# Patient Record
Sex: MALE | Race: WHITE | NOT HISPANIC OR LATINO | Employment: FULL TIME | URBAN - METROPOLITAN AREA
[De-identification: names, ages, dates, MRNs, and addresses within clinical notes are randomized per-mention and may not be internally consistent; named-entity substitution may affect disease eponyms.]

---

## 2017-04-19 ENCOUNTER — TELEPHONE (OUTPATIENT)
Dept: FAMILY MEDICINE | Facility: CLINIC | Age: 42
End: 2017-04-19

## 2017-04-19 DIAGNOSIS — Z98.52 STATUS POST VASECTOMY: Primary | ICD-10-CM

## 2017-04-19 NOTE — TELEPHONE ENCOUNTER
Reason for Call:  Other call back    Detailed comments: Anais, pts spouse, calling. Anais states pt had vasectomy in Dec of 2016 at [facility]. Pt received letter in mail that [facility] closed, and they were to follow up with an [facility] clinic to have semen analysis. [facility] is out of network for pts insurance, so pts spouse called a urology clinic within [place] and was instructed to contact PCP for order for semen analysis and where to have this performed. Pts spouse, Anais, is having IUD removed soon and wants to ensure this test is performed prior. Please advise.    Phone Number Patient can be reached at: Home number on file [phone] (home)    Best Time: Anytime    Can we leave a detailed message on this number? YES    Call taken on 4/19/2017 at 10:26 AM by Mavis Kathleen

## 2017-04-19 NOTE — TELEPHONE ENCOUNTER
Lab order pended.    Consent to communicate with Anais on file.    Writer unsure if this specimen has to be collected at a special lab.    Writer spoke with Eva in lab who stated post-vasectomy semen analysis can be performed here at [facility].    Routing to covering providers.    Please review and sign if agree.    Thank you!  ISELA MainN, RN

## 2017-04-19 NOTE — TELEPHONE ENCOUNTER
Please have patient call [facility] to schedule appointment and give him the following instructions.  Please also mail out referral with instructions - In Salazar HARKINS inbox (in east physician room).  Lindsey Max MD     Instruction Sheet for Patients:    [facility] ([facility])    Semen Sample Collection Procedure for Semen Analysis: To provide the most accurate semen analysis it is essential that the sample be produced at the lab. To provide an atmosphere conducive for specimen production, a TV/VCR and literature are available.     The following are some guidelines to follow for specimen collection:  1. Do not have intercourse or an ejaculation for 2 - 7 days before the day of semen analysis. Less than 2 days or greater than 7 days abstinence can influence semen quality.  2. Your wife/partner may accompany you if this will not hinder sample production. She may not facilitate sample production other than to assist in masturbation.  3. The semen sample must be collected by masturbation* into the sterile specimen container that has been provided. If you have difficulties with masturbation please consult with the technologist.  4. Specimens produced outside of the Andrology Lab will only be accepted if delivered by the man who produced the semen sample. There are no exceptions to this policy. Specimens delivered by any other individual will not be accepted. If arrangements have been made to produce the semen sample at home please follow the preceding instructions. The specimen should be kept at body temperature and delivered to Andrology Lab within 1 hr after specimen production.   5. Unlabeled specimens and specimens in non-approved containers will not be accepted.  *In cases where specimen collection is unable to be produced by masturbation, a special condom collection kit can be used for home production.  Specific instructions for proper use will be provided by trained laboratory  personnel.    If you have any questions concerning any of the above information or if you are unable to keep your appointment please notify us as soon as possible at [phone]. Thank you.

## 2017-04-19 NOTE — TELEPHONE ENCOUNTER
Called patient and reviewed lab phone number and instructions as per below.  Also informed patient this information will go out in tomorrow's mail.    Patient verbalized understanding and in agreement with plan.    JOSE Ackerman, , placed instructions/referral in outgoing mail basket.    ISELA MainN, RN

## 2017-04-24 DIAGNOSIS — Z98.52 STATUS POST VASECTOMY: ICD-10-CM

## 2017-04-24 LAB
ABSTINENCE DAYS: 3 DAYS (ref 2–7)
AGGLUTINATION: NORMAL YES/NO
ANALYSIS TEMP - CENTIGRADE: 22 CENTIGRADE
CELL FRAGMENTS: NORMAL %
COLLECTION METHOD: NORMAL
COLLECTION SITE: NORMAL
CONSENT TO RELEASE TO PARTNER: YES
IMMATURE SPERM: NORMAL %
IMMOTILE: NORMAL %
LAB RECEIPT TIME: NORMAL
LIQUEFIED: NORMAL YES/NO
NON-PROGRESSIVE MOTILITY: NORMAL %
PROGRESSIVE MOTILITY: NORMAL % (ref 32–?)
ROUND CELLS: 0.1 MILLION/ML (ref ?–2)
SPECIMEN CONCENTRATION: NORMAL MILLION/ML (ref 15–?)
SPECIMEN PH: 8 PH (ref 7.2–?)
SPECIMEN TYPE: NORMAL
SPECIMEN VOL UR: 4.5 ML (ref 1.5–?)
TIME OF ANALYSIS: NORMAL
TOTAL NUMBER: 0 MILLION (ref 39–?)
TOTAL PROGRESSIVE MOTILE: NORMAL MILLION (ref 15.6–?)
VISCOUS: NORMAL YES/NO
WBC SPECIMEN: NORMAL %

## 2017-04-24 PROCEDURE — 89321 SEMEN ANAL SPERM DETECTION: CPT

## 2017-06-22 ENCOUNTER — TELEPHONE (OUTPATIENT)
Dept: FAMILY MEDICINE | Facility: CLINIC | Age: 42
End: 2017-06-22

## 2017-06-22 DIAGNOSIS — Z83.719 FAMILY HISTORY OF POLYPS IN THE COLON: Primary | ICD-10-CM

## 2017-06-22 NOTE — TELEPHONE ENCOUNTER
Called patient and informed him of message per below from Dr. Wegener.    Patient verbalized understanding and in agreement with plan.    Patient would like Dr. Croft to do colonoscopy.    Patient was unable to take down colorectal clinic phone number, so writer called back and left voicemail with referral information, per patient request.    ISELA MainN, RN

## 2017-06-22 NOTE — TELEPHONE ENCOUNTER
Dr.Wegener--  --Patient called for screening colonoscopy order because of family history.  --I called patient and he says aunt had a foot of colon removed and  dad has polyps.      Keisha Contreras RN

## 2017-06-22 NOTE — TELEPHONE ENCOUNTER
Agree. Signed. Does he want Dr. Croft to do it however?  If so we would need a colorectal referral.  I placed that as well. Joel Wegener,MD

## 2017-07-11 ENCOUNTER — TELEPHONE (OUTPATIENT)
Dept: GASTROENTEROLOGY | Facility: OUTPATIENT CENTER | Age: 42
End: 2017-07-11

## 2017-07-11 DIAGNOSIS — Z12.11 ENCOUNTER FOR SCREENING COLONOSCOPY: Primary | ICD-10-CM

## 2017-07-11 NOTE — TELEPHONE ENCOUNTER
Patient taking any blood thinners ? no    Heart disease ? denies    Lung disease ? denies      Sleep apnea ? denies    Diabetic ? denies    Kidney disease ? denies    Dialysis ? n/a    Electronic implanted medical devices ? denies    Are you taking any narcotic pain medication ? no  What is your daily dosage ?    PTSD ? n/a    Prep instructions reviewed with patient ? patient and his wife declined review.  policy, MAC sedation plan reviewed. Advised them patient's wife should stay with him post exam    Pharmacy : [facility]    Indication for procedure :  Diagnoses      Family history of polyps in the colon [Z83.71]  - Primary             Referring provider :  Providers      Authorizing Provider Encounter Provider     Wegener, Joel Daniel Irwin, MD

## 2017-07-18 ENCOUNTER — TRANSFERRED RECORDS (OUTPATIENT)
Dept: HEALTH INFORMATION MANAGEMENT | Facility: CLINIC | Age: 42
End: 2017-07-18

## 2018-01-18 ENCOUNTER — OFFICE VISIT (OUTPATIENT)
Dept: FAMILY MEDICINE | Facility: CLINIC | Age: 43
End: 2018-01-18
Payer: COMMERCIAL

## 2018-01-18 VITALS
TEMPERATURE: 98 F | HEART RATE: 59 BPM | RESPIRATION RATE: 14 BRPM | DIASTOLIC BLOOD PRESSURE: 89 MMHG | SYSTOLIC BLOOD PRESSURE: 136 MMHG | WEIGHT: 207.75 LBS | BODY MASS INDEX: 26.66 KG/M2

## 2018-01-18 DIAGNOSIS — R68.89 FLU-LIKE SYMPTOMS: Primary | ICD-10-CM

## 2018-01-18 PROBLEM — D36.9 TUBULAR ADENOMA: Status: ACTIVE | Noted: 2017-07-18

## 2018-01-18 LAB
FLUAV+FLUBV AG SPEC QL: NEGATIVE
FLUAV+FLUBV AG SPEC QL: NEGATIVE
SPECIMEN SOURCE: NORMAL

## 2018-01-18 PROCEDURE — 87804 INFLUENZA ASSAY W/OPTIC: CPT | Performed by: FAMILY MEDICINE

## 2018-01-18 PROCEDURE — 99213 OFFICE O/P EST LOW 20 MIN: CPT | Performed by: FAMILY MEDICINE

## 2018-01-18 NOTE — PATIENT INSTRUCTIONS
rapid flu negative  Could be early in disease and false positive or form another virus  Given no risk factors no indicatio in absence of influenza positive to treat with tamiflu   flonase 1 spray each nostril daily 2 weeks  Zyrtec 10 mg daily 2 week  mucinex 600 mg twice a day 1 week  Humidifier in room may help  supportive care as below  Go to the Er if worse  Follow up with primary if symptoms persist     Your symptoms and exam today indicate that you have a viral upper respiratory illness.  This includes viral rhinosinusitis and viral bronchitis.  Antibiotics do not help viral illnesses; the best remedies treat the symptoms (see below).  The typical course of a viral illness is that you feel rather miserable for the first few days - with sore throat, runny nose/nasal congestion, cough, and sometimes fever and body aches.  You should start to feel better after about 5-7 days and much better by 10-14 days.  If you develop sudden worsening of symptoms or fever after the first 5-7 days, or if you have persistence of your symptoms beyond 14 days, let us know as you may have developed a secondary bacterial infection.      For symptom relief I suggest tryin. Steam.  Take a long, hot shower.  Or if you don't want to get in the shower just run it with the bathroom door shut for a few minutes and breathe the steam.  2. Drink hot liquids frequently such as tea or hot water with honey and lemon.  3. Acetaminophen (Tylenol) and ibuprofen (Motrin or Advil) as needed for headache, sore throat, body aches, or fever.  4. For loosening phlegm and sputum try guaifenesin (available in many combination products and alone as plain Robitussin or plain Mucinex) and for cough suppression you can try dextromethorphan (Delsym or combined in other products).  5. For nasal congestion try:    An oral decongestant.  The only decongestant I recommend is pseudoephedrine. Ask the pharmacist for the over the counter (but real) pseudoephedrine  - not phenylephrine.  This can raise your blood pressure and heart rate so do not use this if you have hypertension.       Afrin spray for 3 days.  (Never use afrin nasal spray for more than 3 days as there is a risk of developing tolerance and rebound/worsening nasal congestion if used longer than this.)    Nealmed sinus rinses.    Nasal steroid spray such as nasacort or flonase, which are over-the-counter.  6. And most importantly: plenty of rest and sleep  especially while you have a fever.     Stop smoking and avoid secondhand smoke    Drink lots of fluids such as water and clear soups. Fluids help loosen mucus. Fluids are also important because they help prevent dehydration.    Gargle with warm salt water a few times a day to relieve a sore throat. Throat sprays or lozenges may also help relieve the pain.    Avoid alcohol.    Use saline (salt water) nose drops to help loosen mucus and moisten the tender skin in your nose.  Encouraged mucinex, warm salt water gargles, cepacol spray, soothers/lozenges, sinus rinses (neilmed), flonase (2 sprays per nostril daily x 2 weeks), vitamin c, fluids and rest.  May alternate tylenol and NSAIDS (ibuprofen, advil, aleve type products) every 4-6 hours for the next few days as needed.   No need for oral antibiotic at this time.

## 2018-01-18 NOTE — MR AVS SNAPSHOT
After Visit Summary   2018    Jerry Hernandez    MRN: [id number]           Patient Information     Date Of Birth          1975        Visit Information        Provider Department      2018 3:00 PM Bettye Quinn MD [facility]        Today's Diagnoses     Flu-like symptoms    -  1      Care Instructions    rapid flu negative  Could be early in disease and false positive or form another virus  Given no risk factors no indicatio in absence of influenza positive to treat with tamiflu   flonase 1 spray each nostril daily 2 weeks  Zyrtec 10 mg daily 2 week  mucinex 600 mg twice a day 1 week  Humidifier in room may help  supportive care as below  Go to the Er if worse  Follow up with primary if symptoms persist     Your symptoms and exam today indicate that you have a viral upper respiratory illness.  This includes viral rhinosinusitis and viral bronchitis.  Antibiotics do not help viral illnesses; the best remedies treat the symptoms (see below).  The typical course of a viral illness is that you feel rather miserable for the first few days - with sore throat, runny nose/nasal congestion, cough, and sometimes fever and body aches.  You should start to feel better after about 5-7 days and much better by 10-14 days.  If you develop sudden worsening of symptoms or fever after the first 5-7 days, or if you have persistence of your symptoms beyond 14 days, let us know as you may have developed a secondary bacterial infection.      For symptom relief I suggest tryin. Steam.  Take a long, hot shower.  Or if you don't want to get in the shower just run it with the bathroom door shut for a few minutes and breathe the steam.  2. Drink hot liquids frequently such as tea or hot water with honey and lemon.  3. Acetaminophen (Tylenol) and ibuprofen (Motrin or Advil) as needed for headache, sore throat, body aches, or fever.  4. For loosening phlegm and sputum try guaifenesin  (available in many combination products and alone as plain Robitussin or plain Mucinex) and for cough suppression you can try dextromethorphan (Delsym or combined in other products).  5. For nasal congestion try:    An oral decongestant.  The only decongestant I recommend is pseudoephedrine. Ask the pharmacist for the over the counter (but real) pseudoephedrine - not phenylephrine.  This can raise your blood pressure and heart rate so do not use this if you have hypertension.       Afrin spray for 3 days.  (Never use afrin nasal spray for more than 3 days as there is a risk of developing tolerance and rebound/worsening nasal congestion if used longer than this.)    Nealmed sinus rinses.    Nasal steroid spray such as nasacort or flonase, which are over-the-counter.  6. And most importantly: plenty of rest and sleep  especially while you have a fever.     Stop smoking and avoid secondhand smoke    Drink lots of fluids such as water and clear soups. Fluids help loosen mucus. Fluids are also important because they help prevent dehydration.    Gargle with warm salt water a few times a day to relieve a sore throat. Throat sprays or lozenges may also help relieve the pain.    Avoid alcohol.    Use saline (salt water) nose drops to help loosen mucus and moisten the tender skin in your nose.  Encouraged mucinex, warm salt water gargles, cepacol spray, soothers/lozenges, sinus rinses (neilmed), flonase (2 sprays per nostril daily x 2 weeks), vitamin c, fluids and rest.  May alternate tylenol and NSAIDS (ibuprofen, advil, aleve type products) every 4-6 hours for the next few days as needed.   No need for oral antibiotic at this time.            Follow-ups after your visit        Who to contact     If you have questions or need follow up information about today's clinic visit or your schedule please contact [facility] directly at [phone].  Normal or non-critical lab and imaging results will be communicated  "to you by [employer], letter or phone within 4 business days after the clinic has received the results. If you do not hear from us within 7 days, please contact the clinic through [employer] or phone. If you have a critical or abnormal lab result, we will notify you by phone as soon as possible.  Submit refill requests through [employer] or call your pharmacy and they will forward the refill request to us. Please allow 3 business days for your refill to be completed.          Additional Information About Your Visit        [employer] Information     [employer] lets you send messages to your doctor, view your test results, renew your prescriptions, schedule appointments and more. To sign up, go to [email or web address] . Click on \"Log in\" on the left side of the screen, which will take you to the Welcome page. Then click on \"Sign up Now\" on the right side of the page.     You will be asked to enter the access code listed below, as well as some personal information. Please follow the directions to create your username and password.     Your access code is: [email or web address]  Expires: 2018  3:21 PM     Your access code will  in 90 days. If you need help or a new code, please call your [facility] clinic or [phone].        Care EveryWhere ID     This is your Care EveryWhere ID. This could be used by other organizations to access your [facility] medical records  [id number]        Your Vitals Were     Pulse Temperature Respirations BMI (Body Mass Index)          59 98  F (36.7  C) (Oral) 14 26.66 kg/m2         Blood Pressure from Last 3 Encounters:   18 136/89   16 132/86   16 122/80    Weight from Last 3 Encounters:   18 207 lb 12 oz (94.2 kg)   16 204 lb (92.5 kg)   16 202 lb 4.8 oz (91.8 kg)              We Performed the Following     Influenza A/B antigen        Primary Care Provider Office Phone # Fax #    Joel Daniel Irwin Wegener, [phone]  [facility] [address]        Equal Access to Services     MICHAEL SIMEON : Hadii aad ku hadfidelinalazaro Ivettemellissa, wasridharda luqadaha, qastephanieta arlethalmashady sandoval. So [facility] [phone].    ATENCIÓN: Si habla español, tiene a zhang disposición servicios gratuitos de asistencia lingüística. Llame al [phone].    We comply with applicable federal civil rights laws and [place] laws. We do not discriminate on the basis of race, color, national origin, age, disability, sex, sexual orientation, or gender identity.            Thank you!     Thank you for choosing [facility]  for your care. Our goal is always to provide you with excellent care. Hearing back from our patients is one way we can continue to improve our services. Please take a few minutes to complete the written survey that you may receive in the mail after your visit with us. Thank you!             Your Updated Medication List - Protect others around you: Learn how to safely use, store and throw away your medicines at www.disposemymeds.org.          This list is accurate as of: 1/18/18  3:21 PM.  Always use your most recent med list.                   Brand Name Dispense Instructions for use Diagnosis    PROBIOTIC PO           ranitidine 150 MG tablet    ZANTAC    180 tablet    Take 1 tablet (150 mg) by mouth 2 times daily    Gastroesophageal reflux disease without esophagitis

## 2018-01-18 NOTE — PROGRESS NOTES
SUBJECTIVE:   Jerry Hernandez is a 42 year old male who presents to clinic today for the following health issues:      RESPIRATORY SYMPTOMS      Duration: 2 days    Description  sore throat, cough, chills, ear pain both, headache, fatigue/malaise, myalgias, nausea and dizzy    Severity: moderate    Accompanying signs and symptoms: None; pt feels drain     History (predisposing factors):  Pt works at [employer]     Precipitating or alleviating factors: all day     Therapies tried and outcome: nasal spray/ swab    yesterday felt start in the nose, sinus, general feeling of weakness, nauseated, no fever never usually gets it, some ear pain, woke up with a sore throat this. Sore throat better now. Took some zycam  & nose swabs seemed to help. Today symptoms came back right away. Works in [employer] in [employer]. Would like a flu swab    No fever, mild dizziness, some earache, sore throat better, mild runny nose, no trouble hearing, some decreased smelling, no trouble tasting or swallowing, appetite ok but missed lunch as was getting nauseated, has some post nasal drainage, no chest pain, trouble breathing or palpitations,  Chronic  heart burn well controlled on zantac, no vomiting or diarrhea, though some looser stools, no  constipation, no blood in stools or black stools, no weight loss or night sweats. No urinary complaints. No pelvic complaints. No leg swelling or rash or joint pain.     Problem list and histories reviewed & adjusted, as indicated.  Additional history: as documented    Patient Active Problem List   Diagnosis     GERD (gastroesophageal reflux disease)     Low back pain     Perineal abscess     Tubular adenoma     Past Surgical History:   Procedure Laterality Date     C APPENDECTOMY  2005     EXAM UNDER ANESTHESIA ANUS N/A 7/8/2015    Procedure: EXAM UNDER ANESTHESIA ANUS;  Surgeon: James Krause MD;  Location: [facility]     EXAM UNDER ANESTHESIA RECTUM N/A 1/30/2015     Procedure: EXAM UNDER ANESTHESIA RECTUM;  Surgeon: James Krause MD;  Location: [facility]     EXAM UNDER ANESTHESIA, FISTULOTOMY RECTUM, COMBINED N/A 4/6/2016    Procedure: COMBINED EXAM UNDER ANESTHESIA, FISTULOTOMY RECTUM;  Surgeon: James Krause MD;  Location: [facility]     FISTULOTOMY RECTUM N/A 7/8/2016    Procedure: FISTULOTOMY RECTUM;  Surgeon: James Krause MD;  Location: [facility]     HC ESOPHAGOSCOPY, DIAGNOSTIC  2005    Normal     INCISION AND DRAINAGE RECTUM, COMBINED N/A 1/28/2015    Procedure: COMBINED INCISION AND DRAINAGE RECTUM;  Surgeon: Natasha Roldan MD;  Location: [facility]     IRRIGATION AND DEBRIDEMENT RECTUM, COMBINED N/A 1/30/2015    Procedure: COMBINED IRRIGATION AND DEBRIDEMENT RECTUM;  Surgeon: James Krause MD;  Location: [facility]     PLACEMENT OF SETON RECTUM N/A 1/30/2015    Procedure: PLACEMENT OF SETON RECTUM;  Surgeon: James Krause MD;  Location: [facility] OR     VASECTOMY  12/2016       Social History   Substance Use Topics     Smoking status: Former Smoker     Smokeless tobacco: Never Used     Alcohol use Yes      Comment: 1-2 beers/month -occ     Family History   Problem Relation Age of Onset     HEART DISEASE Maternal Grandfather      C.A.D. Maternal Grandfather      Asthma Father      CEREBROVASCULAR DISEASE Paternal Grandfather      Asthma Sister          Allergies   Allergen Reactions     Shellfish Allergy Difficulty breathing     Hives, visual disturbance     Recent Labs   Lab Test  06/29/16   1610  07/01/15   1615   11/25/14   0946  01/24/11   1508   ALT   --    --    --   12  14   CR  0.99  0.98   < >  0.92  1.03   GFRESTIMATED  83  86   < >  >90  Non  GFR Calc    83   GFRESTBLACK  >90   GFR Calc    >90   GFR Calc     < >  >90   GFR Calc    >90   POTASSIUM  4.6  4.5   < >  4.4  3.6    < > = values in this interval not displayed.      BP Readings from Last 3 Encounters:   01/18/18 136/89   11/01/16 132/86    07/19/16 122/80    Wt Readings from Last 3 Encounters:   01/18/18 207 lb 12 oz (94.2 kg)   11/01/16 204 lb (92.5 kg)   08/25/16 202 lb 4.8 oz (91.8 kg)                  Labs reviewed in EPIC          Reviewed and updated as needed this visit by [facility]  Meds       Reviewed and updated as needed this visit by Provider         ROS:  Constitutional, HEENT, cardiovascular, pulmonary, GI, , musculoskeletal, neuro, skin, endocrine and psych systems are negative, except as otherwise noted.      OBJECTIVE:   /89 (BP Location: Right arm, Patient Position: Chair, Cuff Size: Adult Large)  Pulse 59  Temp 98  F (36.7  C) (Oral)  Resp 14  Wt 207 lb 12 oz (94.2 kg)  BMI 26.66 kg/m2  Body mass index is 26.66 kg/(m^2).  GENERAL: healthy, alert and no distress  EYES: Eyes grossly normal to inspection, PERRL and conjunctivae and sclerae normal  HENT: normal cephalic/atraumatic, ear canals and TM's normal, nose and mouth without ulcers or lesions, nasal mucosa edematous , rhinorrhea clear, oropharynx clear, oral mucous membranes moist and sinuses: not tender  NECK: no adenopathy, no asymmetry, masses, or scars and thyroid normal to palpation  RESP: lungs clear to auscultation - no rales, rhonchi or wheezes  CV: regular rate and rhythm, normal S1 S2, no S3 or S4, no murmur, click or rub, no peripheral edema and peripheral pulses strong  ABDOMEN: soft, non tender, no hepatosplenomegaly, no masses and bowel sounds normal  MS: no gross musculoskeletal defects noted, no edema  SKIN: no suspicious lesions or rashes  NEURO: Normal strength and tone, mentation intact and speech normal  PSYCH: mentation appears normal, affect normal/bright, fatigued and appearance well groomed    Diagnostic Test Results:  No results found for this or any previous visit (from the past 24 hour(s)).    ASSESSMENT/PLAN:   Hx of GERD on zantac, low back pain, prior appy, prior perirectal abscess S/P I & D x2, EUS rectum  2015 with  seton, then with fistulectomy & 7/2016 , hx of vasectomy, , allergic to sulfa, seen for post nasal drip 11/2016, family hx of colonic polyps, colonoscopy 7/18/17 for chronic diarrhea & hemorrhoids, showed tubular adenoma, here for     1. Flu-like symptoms  Rapid flu negative. Could be early in disease and false positive or from another virus. Given no risk factors no indication in absence of influenza positive to treat with tamiflu. Cn try Flonase 1 spray each nostril daily 2 weeks. Zyrtec 10 mg daily 2 week. mucinex 600 mg twice a day 1 week. Humidifier in room may help. supportive care as below. Go to the Er if worse. Follow up with primary if symptoms persist  reminded should return when well to PCP for a physical due for lipids, colonoscopy recheck in 2022.  - Influenza A/B antigen    See Patient Instructions  Patient Instructions   rapid flu negative  Could be early in disease and false positive or form another virus  Given no risk factors no indicatio in absence of influenza positive to treat with tamiflu   flonase 1 spray each nostril daily 2 weeks  Zyrtec 10 mg daily 2 week  mucinex 600 mg twice a day 1 week  Humidifier in room may help  supportive care as below  Go to the Er if worse  Follow up with primary if symptoms persist     Your symptoms and exam today indicate that you have a viral upper respiratory illness.  This includes viral rhinosinusitis and viral bronchitis.  Antibiotics do not help viral illnesses; the best remedies treat the symptoms (see below).  The typical course of a viral illness is that you feel rather miserable for the first few days - with sore throat, runny nose/nasal congestion, cough, and sometimes fever and body aches.  You should start to feel better after about 5-7 days and much better by 10-14 days.  If you develop sudden worsening of symptoms or fever after the first 5-7 days, or if you have persistence of your symptoms beyond 14 days, let us know as you may have developed a  secondary bacterial infection.      For symptom relief I suggest tryin. Steam.  Take a long, hot shower.  Or if you don't want to get in the shower just run it with the bathroom door shut for a few minutes and breathe the steam.  2. Drink hot liquids frequently such as tea or hot water with honey and lemon.  3. Acetaminophen (Tylenol) and ibuprofen (Motrin or Advil) as needed for headache, sore throat, body aches, or fever.  4. For loosening phlegm and sputum try guaifenesin (available in many combination products and alone as plain Robitussin or plain Mucinex) and for cough suppression you can try dextromethorphan (Delsym or combined in other products).  5. For nasal congestion try:    An oral decongestant.  The only decongestant I recommend is pseudoephedrine. Ask the pharmacist for the over the counter (but real) pseudoephedrine - not phenylephrine.  This can raise your blood pressure and heart rate so do not use this if you have hypertension.       Afrin spray for 3 days.  (Never use afrin nasal spray for more than 3 days as there is a risk of developing tolerance and rebound/worsening nasal congestion if used longer than this.)    Nealmed sinus rinses.    Nasal steroid spray such as nasacort or flonase, which are over-the-counter.  6. And most importantly: plenty of rest and sleep  especially while you have a fever.     Stop smoking and avoid secondhand smoke    Drink lots of fluids such as water and clear soups. Fluids help loosen mucus. Fluids are also important because they help prevent dehydration.    Gargle with warm salt water a few times a day to relieve a sore throat. Throat sprays or lozenges may also help relieve the pain.    Avoid alcohol.    Use saline (salt water) nose drops to help loosen mucus and moisten the tender skin in your nose.  Encouraged mucinex, warm salt water gargles, cepacol spray, soothers/lozenges, sinus rinses (neilmed), flonase (2 sprays per nostril daily x 2 weeks), vitamin  c, fluids and rest.  May alternate tylenol and NSAIDS (ibuprofen, advil, aleve type products) every 4-6 hours for the next few days as needed.   No need for oral antibiotic at this time.        Bettye Quinn MD  [facility]

## 2018-07-09 ENCOUNTER — OFFICE VISIT (OUTPATIENT)
Dept: FAMILY MEDICINE | Facility: CLINIC | Age: 43
End: 2018-07-09
Payer: COMMERCIAL

## 2018-07-09 VITALS
RESPIRATION RATE: 16 BRPM | DIASTOLIC BLOOD PRESSURE: 89 MMHG | BODY MASS INDEX: 26.66 KG/M2 | WEIGHT: 207.75 LBS | HEART RATE: 55 BPM | OXYGEN SATURATION: 96 % | SYSTOLIC BLOOD PRESSURE: 133 MMHG | HEIGHT: 74 IN | TEMPERATURE: 98.7 F

## 2018-07-09 DIAGNOSIS — R12 HEART BURN: ICD-10-CM

## 2018-07-09 DIAGNOSIS — R10.10 UPPER ABDOMINAL PAIN: ICD-10-CM

## 2018-07-09 DIAGNOSIS — D36.9 TUBULAR ADENOMA: ICD-10-CM

## 2018-07-09 DIAGNOSIS — Z11.4 ENCOUNTER FOR SCREENING FOR HIV: ICD-10-CM

## 2018-07-09 DIAGNOSIS — Z13.6 ENCOUNTER FOR SCREENING FOR CARDIOVASCULAR DISORDERS: ICD-10-CM

## 2018-07-09 DIAGNOSIS — R19.7 DIARRHEA, UNSPECIFIED TYPE: ICD-10-CM

## 2018-07-09 DIAGNOSIS — R19.7 DIARRHEA, UNSPECIFIED TYPE: Primary | ICD-10-CM

## 2018-07-09 LAB
BASOPHILS # BLD AUTO: 0 10E9/L (ref 0–0.2)
BASOPHILS NFR BLD AUTO: 0.3 %
CRP SERPL-MCNC: <2.9 MG/L (ref 0–8)
DIFFERENTIAL METHOD BLD: NORMAL
EOSINOPHIL # BLD AUTO: 0.1 10E9/L (ref 0–0.7)
EOSINOPHIL NFR BLD AUTO: 2.1 %
ERYTHROCYTE [DISTWIDTH] IN BLOOD BY AUTOMATED COUNT: 13.6 % (ref 10–15)
ERYTHROCYTE [SEDIMENTATION RATE] IN BLOOD BY WESTERGREN METHOD: 5 MM/H (ref 0–15)
HCT VFR BLD AUTO: 44.9 % (ref 40–53)
HGB BLD-MCNC: 15.4 G/DL (ref 13.3–17.7)
LIPASE SERPL-CCNC: 113 U/L (ref 73–393)
LYMPHOCYTES # BLD AUTO: 1.6 10E9/L (ref 0.8–5.3)
LYMPHOCYTES NFR BLD AUTO: 25.4 %
MCH RBC QN AUTO: 30.3 PG (ref 26.5–33)
MCHC RBC AUTO-ENTMCNC: 34.3 G/DL (ref 31.5–36.5)
MCV RBC AUTO: 88 FL (ref 78–100)
MONOCYTES # BLD AUTO: 0.8 10E9/L (ref 0–1.3)
MONOCYTES NFR BLD AUTO: 12.5 %
NEUTROPHILS # BLD AUTO: 3.7 10E9/L (ref 1.6–8.3)
NEUTROPHILS NFR BLD AUTO: 59.7 %
PLATELET # BLD AUTO: 185 10E9/L (ref 150–450)
RBC # BLD AUTO: 5.09 10E12/L (ref 4.4–5.9)
WBC # BLD AUTO: 6.2 10E9/L (ref 4–11)

## 2018-07-09 PROCEDURE — 87493 C DIFF AMPLIFIED PROBE: CPT | Mod: 59 | Performed by: FAMILY MEDICINE

## 2018-07-09 PROCEDURE — 83721 ASSAY OF BLOOD LIPOPROTEIN: CPT | Mod: 59 | Performed by: FAMILY MEDICINE

## 2018-07-09 PROCEDURE — 99214 OFFICE O/P EST MOD 30 MIN: CPT | Performed by: FAMILY MEDICINE

## 2018-07-09 PROCEDURE — 87328 CRYPTOSPORIDIUM AG IA: CPT | Mod: 90 | Performed by: FAMILY MEDICINE

## 2018-07-09 PROCEDURE — 85025 COMPLETE CBC W/AUTO DIFF WBC: CPT | Performed by: FAMILY MEDICINE

## 2018-07-09 PROCEDURE — 83690 ASSAY OF LIPASE: CPT | Performed by: FAMILY MEDICINE

## 2018-07-09 PROCEDURE — 99000 SPECIMEN HANDLING OFFICE-LAB: CPT | Performed by: FAMILY MEDICINE

## 2018-07-09 PROCEDURE — 80061 LIPID PANEL: CPT | Performed by: FAMILY MEDICINE

## 2018-07-09 PROCEDURE — 86140 C-REACTIVE PROTEIN: CPT | Performed by: FAMILY MEDICINE

## 2018-07-09 PROCEDURE — 87506 IADNA-DNA/RNA PROBE TQ 6-11: CPT | Performed by: FAMILY MEDICINE

## 2018-07-09 PROCEDURE — 36415 COLL VENOUS BLD VENIPUNCTURE: CPT | Performed by: FAMILY MEDICINE

## 2018-07-09 PROCEDURE — 87177 OVA AND PARASITES SMEARS: CPT | Performed by: FAMILY MEDICINE

## 2018-07-09 PROCEDURE — 80053 COMPREHEN METABOLIC PANEL: CPT | Performed by: FAMILY MEDICINE

## 2018-07-09 PROCEDURE — 87206 SMEAR FLUORESCENT/ACID STAI: CPT | Mod: 59 | Performed by: FAMILY MEDICINE

## 2018-07-09 PROCEDURE — 85652 RBC SED RATE AUTOMATED: CPT | Performed by: FAMILY MEDICINE

## 2018-07-09 PROCEDURE — 84443 ASSAY THYROID STIM HORMONE: CPT | Performed by: FAMILY MEDICINE

## 2018-07-09 PROCEDURE — 87209 SMEAR COMPLEX STAIN: CPT | Performed by: FAMILY MEDICINE

## 2018-07-09 PROCEDURE — 87389 HIV-1 AG W/HIV-1&-2 AB AG IA: CPT | Performed by: FAMILY MEDICINE

## 2018-07-09 NOTE — LETTER
July 12, 2018      Jerry Hernandez  [address] [id number]        Dear ,    We are writing to inform you of your test results.    Final stool read negative for parasites.    Resulted Orders   Ova and Parasite Exam Routine   Result Value Ref Range    Specimen Description Feces     Ova and Parasite Exam Routine parasitology exam negative     Ova and Parasite Exam       Cryptosporidium, Cyclospora, and Microsporidia are not readily detected by this method. A   single negative specimen does not rule out parasitic infection.     Clostridium difficile Toxin B PCR   Result Value Ref Range    Specimen Description Feces     C Diff Toxin B PCR Negative NEG^Negative      Comment:      Negative: Clostridium difficile target DNA sequences NOT detected, presumed   negative for Clostridium difficile toxin B or the number of bacteria present   may be below the limit of detection for the test.  FDA approved assay performed using [employer] real-time PCR.  A negative result does not exclude actual disease due to Clostridium difficile   and may be due to improper collection, handling and storage of the specimen   or the number of organisms in the specimen is below the detection limit of the   assay.     Cryptosporidium in stool, stain   Result Value Ref Range    Specimen Description Feces     Cryptosporidium Stain Stool       No oocysts of Cryptosporidium species, Cyclospora cayetanensis, or Cystoisospora   (Isospora) rudy found      Cryptosporidium Stain Stool       A modified acid fast stain reveals no oocysts of Cryptosporidium, Cyclospora, or   Cystoisospora (Isospora). Consider other causes for symptoms      Cryptosporidium Stain Stool       Lucille Tian M.D., Assistant Medical Director  7/11/18     Enteric Bacteria and Virus Panel by NAILA Stool   Result Value Ref Range    Campylobacter group by NAILA Not Detected NDET^Not Detected    Salmonella species by NAILA Not Detected NDET^Not  Detected    Shigella species by NAILA Not Detected NDET^Not Detected    Vibrio group by NAILA Not Detected NDET^Not Detected    Rotavirus A by NAILA Not Detected NDET^Not Detected    Shiga toxin 1 gene by NAILA Not Detected NDET^Not Detected    Shiga toxin 2 gene by NAILA Not Detected NDET^Not Detected    Norovirus I and II by NAILA Not Detected NDET^Not Detected    Yersinia enterocolitica by NAILA Not Detected NDET^Not Detected    Enteric pathogen comment       Testing performed by multiplexed, qualitative PCR using the [employer] Enteric   Pathogens Nucleic Acid Test. Results should not be used as the sole basis for diagnosis,   treatment, or other patient management decisions.        Comment:      Positive results do not rule out co-infection with other organisms that are   not detected by this test, and may not be the sole or definitive cause of   patient illness.   Negative results in the setting of clinical illness compatible with   gastroenteritis may be due to infection by pathogens that are not detected by   this test or non-infectious causes such as ulcerative colitis, irritable bowel   syndrome, or Crohn's disease.   Note: Shiga toxin producing E. coli (STEC) typically harbor one or both genes   that encode for Shiga toxins 1 and 2.         If you have any questions or concerns, please call the clinic at the number listed above.       Sincerely,    Bettye Quinn MD/nr

## 2018-07-09 NOTE — LETTER
July 10, 2018      Jerry Hernandez  [address] [address]        Dear ,    We are writing to inform you of your test results.    Negative for HIV    Resulted Orders   CBC with platelets differential   Result Value Ref Range    WBC 6.2 4.0 - 11.0 10e9/L    RBC Count 5.09 4.4 - 5.9 10e12/L    Hemoglobin 15.4 13.3 - 17.7 g/dL    Hematocrit 44.9 40.0 - 53.0 %    MCV 88 78 - 100 fl    MCH 30.3 26.5 - 33.0 pg    MCHC 34.3 31.5 - 36.5 g/dL    RDW 13.6 10.0 - 15.0 %    Platelet Count 185 150 - 450 10e9/L    Diff Method Automated Method     % Neutrophils 59.7 %    % Lymphocytes 25.4 %    % Monocytes 12.5 %    % Eosinophils 2.1 %    % Basophils 0.3 %    Absolute Neutrophil 3.7 1.6 - 8.3 10e9/L    Absolute Lymphocytes 1.6 0.8 - 5.3 10e9/L    Absolute Monocytes 0.8 0.0 - 1.3 10e9/L    Absolute Eosinophils 0.1 0.0 - 0.7 10e9/L    Absolute Basophils 0.0 0.0 - 0.2 10e9/L   Comprehensive metabolic panel   Result Value Ref Range    Sodium 139 133 - 144 mmol/L    Potassium 3.9 3.4 - 5.3 mmol/L    Chloride 105 94 - 109 mmol/L    Carbon Dioxide 21 20 - 32 mmol/L    Anion Gap 13 3 - 14 mmol/L    Glucose 93 70 - 99 mg/dL      Comment:      Non Fasting    Urea Nitrogen 16 7 - 30 mg/dL    Creatinine 1.05 0.66 - 1.25 mg/dL    GFR Estimate 77 >60 mL/min/1.7m2      Comment:      Non  GFR Calc    GFR Estimate If Black >90 >60 mL/min/1.7m2      Comment:       GFR Calc    Calcium 9.1 8.5 - 10.1 mg/dL    Bilirubin Total 0.6 0.2 - 1.3 mg/dL    Albumin 4.3 3.4 - 5.0 g/dL    Protein Total 8.4 6.8 - 8.8 g/dL    Alkaline Phosphatase 65 40 - 150 U/L    ALT 76 (H) 0 - 70 U/L     (H) 0 - 45 U/L   TSH with free T4 reflex   Result Value Ref Range    TSH 1.68 0.40 - 4.00 mU/L   Lipid panel reflex to direct LDL Fasting   Result Value Ref Range    Cholesterol 168 <200 mg/dL    Triglycerides 408 (H) <150 mg/dL      Comment:      Borderline high:  150-199 mg/dl  High:              200-499 mg/dl  Very high:       >499 mg/dl  Non Fasting      HDL Cholesterol 34 (L) >39 mg/dL    LDL Cholesterol Calculated  <100 mg/dL     Cannot estimate LDL when triglyceride exceeds 400 mg/dL    Non HDL Cholesterol 134 (H) <130 mg/dL      Comment:      Above Desirable:  130-159 mg/dl  Borderline high:  160-189 mg/dl  High:             190-219 mg/dl  Very high:       >219 mg/dl     HIV Antigen Antibody Combo   Result Value Ref Range    HIV Antigen Antibody Combo Nonreactive NR^Nonreactive          Comment:      HIV-1 p24 Ag & HIV-1/HIV-2 Ab Not Detected   Lipase   Result Value Ref Range    Lipase 113 73 - 393 U/L   ESR: Erythrocyte sedimentation rate   Result Value Ref Range    Sed Rate 5 0 - 15 mm/h   CRP, inflammation   Result Value Ref Range    CRP Inflammation <2.9 0.0 - 8.0 mg/L   LDL cholesterol direct   Result Value Ref Range    LDL Cholesterol Direct 89 <100 mg/dL      Comment:      Desirable:       <100 mg/dl       If you have any questions or concerns, please call the clinic at the number listed above.       Sincerely,        Bettye Quinn MD/nr

## 2018-07-09 NOTE — LETTER
July 10, 2018      Jerry Hernandez  [address] [address]        Dear ,    We are writing to inform you of your test results.    Normal lipase( pancreatic enzyme ) & crp ( inflammation marker )    Resulted Orders   CBC with platelets differential   Result Value Ref Range    WBC 6.2 4.0 - 11.0 10e9/L    RBC Count 5.09 4.4 - 5.9 10e12/L    Hemoglobin 15.4 13.3 - 17.7 g/dL    Hematocrit 44.9 40.0 - 53.0 %    MCV 88 78 - 100 fl    MCH 30.3 26.5 - 33.0 pg    MCHC 34.3 31.5 - 36.5 g/dL    RDW 13.6 10.0 - 15.0 %    Platelet Count 185 150 - 450 10e9/L    Diff Method Automated Method     % Neutrophils 59.7 %    % Lymphocytes 25.4 %    % Monocytes 12.5 %    % Eosinophils 2.1 %    % Basophils 0.3 %    Absolute Neutrophil 3.7 1.6 - 8.3 10e9/L    Absolute Lymphocytes 1.6 0.8 - 5.3 10e9/L    Absolute Monocytes 0.8 0.0 - 1.3 10e9/L    Absolute Eosinophils 0.1 0.0 - 0.7 10e9/L    Absolute Basophils 0.0 0.0 - 0.2 10e9/L   Comprehensive metabolic panel   Result Value Ref Range    Sodium 139 133 - 144 mmol/L    Potassium 3.9 3.4 - 5.3 mmol/L    Chloride 105 94 - 109 mmol/L    Carbon Dioxide 21 20 - 32 mmol/L    Anion Gap 13 3 - 14 mmol/L    Glucose 93 70 - 99 mg/dL      Comment:      Non Fasting    Urea Nitrogen 16 7 - 30 mg/dL    Creatinine 1.05 0.66 - 1.25 mg/dL    GFR Estimate 77 >60 mL/min/1.7m2      Comment:      Non  GFR Calc    GFR Estimate If Black >90 >60 mL/min/1.7m2      Comment:       GFR Calc    Calcium 9.1 8.5 - 10.1 mg/dL    Bilirubin Total 0.6 0.2 - 1.3 mg/dL    Albumin 4.3 3.4 - 5.0 g/dL    Protein Total 8.4 6.8 - 8.8 g/dL    Alkaline Phosphatase 65 40 - 150 U/L    ALT 76 (H) 0 - 70 U/L     (H) 0 - 45 U/L   TSH with free T4 reflex   Result Value Ref Range    TSH 1.68 0.40 - 4.00 mU/L   Lipid panel reflex to direct LDL Fasting   Result Value Ref Range    Cholesterol 168 <200 mg/dL    Triglycerides 408 (H) <150 mg/dL      Comment:       Borderline high:  150-199 mg/dl  High:             200-499 mg/dl  Very high:       >499 mg/dl  Non Fasting      HDL Cholesterol 34 (L) >39 mg/dL    LDL Cholesterol Calculated  <100 mg/dL     Cannot estimate LDL when triglyceride exceeds 400 mg/dL    Non HDL Cholesterol 134 (H) <130 mg/dL      Comment:      Above Desirable:  130-159 mg/dl  Borderline high:  160-189 mg/dl  High:             190-219 mg/dl  Very high:       >219 mg/dl     HIV Antigen Antibody Combo   Result Value Ref Range    HIV Antigen Antibody Combo Nonreactive NR^Nonreactive          Comment:      HIV-1 p24 Ag & HIV-1/HIV-2 Ab Not Detected   Lipase   Result Value Ref Range    Lipase 113 73 - 393 U/L   ESR: Erythrocyte sedimentation rate   Result Value Ref Range    Sed Rate 5 0 - 15 mm/h   CRP, inflammation   Result Value Ref Range    CRP Inflammation <2.9 0.0 - 8.0 mg/L   LDL cholesterol direct   Result Value Ref Range    LDL Cholesterol Direct 89 <100 mg/dL      Comment:      Desirable:       <100 mg/dl       If you have any questions or concerns, please call the clinic at the number listed above.       Sincerely,        Bettye Quinn MD/nr

## 2018-07-09 NOTE — PROGRESS NOTES
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   Recent use of antibiotics: no    Recent travels: no          Recent medication-new or changes(Rx or OTC): no   Therapies Tried and outcome:  PeptoBismol; Outcome: none    Former smoker, Hx of GERD S/p EGD in 2005 on zantac and probiotics in past and now on PPI's, low back pain, prior appy, prior perirectal abscess S/P EUA 2015, x 2 in 2016, I & D x2, EUS rectum 2015 with seton, then with fistulectomy 7/2016, hx of vasectomy, allergic to sulfa, and shellfish, seen for post nasal drip 11/2016, family hx of colonic polyps, colonoscopy 7/18/17 for chronic diarrhea & hemorrhoids, showed tubular adenoma. Seen 1/18/118 for flu like symptoms. Here for diarrhea    About a month ago noted diarrhea. Watery, 4 to 5 times per day, started loose then watery, Some upper abdominal pain, not improved with BM,  Has chronic heart burn, reflux, on chronic PPI's, mild nausea yesterday and off and on, no vomiting,  No constipation, no blood in stools or black stools, no weight loss or night sweats. No dysuria, hematuria, frequency, urgency, hesitancy, incontinence, No pelvic complaints. 3 beers yesterday. No drugs. No recent antibiotics, works in the hospital in maintenance, no recent out of country travel, just [facility] to [facility] where went fishing, ate fish caught, no sick contacts. Wife had some diarrhea past couple days thinks from dehydration. Eaten at couple mexican restaurant possibly [place] where  had Cyclospora outbreak. Has pets, not sick, new puppy , healthy. No increased stress in life. Has been having fecal urgency. yesterday could be no more than 50 feet from a toilet so that triggered him to come in     No fever, some chills, no headache or dizziness, no double or blurry vision, no facial pain, earache, sore throat, runny nose, post nasal drip, no trouble hearing, smelling, tasting or swallowing, more phlegm lately, ? Allergies, sneezing, itchy eyes, no cough , no chest pain, trouble breathing or palpitations, No leg swelling or joint pain. No rash.     Agreeable to HIV and lipid testing.    Problem list and histories reviewed & adjusted, as indicated.  Additional history: as documented    Patient Active Problem List   Diagnosis     GERD (gastroesophageal reflux disease)     Low back pain     Tubular adenoma     Past Surgical History:   Procedure Laterality Date     C APPENDECTOMY  2005     EXAM UNDER ANESTHESIA ANUS N/A 7/8/2015    Procedure: EXAM UNDER ANESTHESIA ANUS;  Surgeon: James Krause MD;  Location: [facility]     EXAM UNDER ANESTHESIA RECTUM N/A 1/30/2015    Procedure: EXAM UNDER ANESTHESIA RECTUM;  Surgeon: James Krause MD;  Location: [facility]     EXAM UNDER ANESTHESIA, FISTULOTOMY RECTUM, COMBINED N/A 4/6/2016    Procedure: COMBINED EXAM UNDER ANESTHESIA, FISTULOTOMY RECTUM;  Surgeon: James Krause MD;  Location: [facility]     FISTULOTOMY RECTUM N/A 7/8/2016    Procedure: FISTULOTOMY RECTUM;  Surgeon: James Krause MD;  Location: [facility]     HC ESOPHAGOSCOPY, DIAGNOSTIC  2005    Normal     INCISION AND DRAINAGE RECTUM, COMBINED N/A 1/28/2015    Procedure: COMBINED INCISION AND DRAINAGE RECTUM;  Surgeon: Natasha Roldan MD;  Location: [facility]     IRRIGATION AND DEBRIDEMENT RECTUM, COMBINED N/A 1/30/2015    Procedure: COMBINED IRRIGATION AND DEBRIDEMENT RECTUM;  Surgeon: James Krause MD;  Location: [facility]     PLACEMENT OF SETON RECTUM N/A 1/30/2015    Procedure: PLACEMENT OF SETON  RECTUM;  Surgeon: James Krause MD;  Location: [facility] OR     VASECTOMY  12/2016       Social History   Substance Use Topics     Smoking status: Former Smoker     Smokeless tobacco: Never Used     Alcohol use Yes      Comment: 1-2 beers/month -occ     Family History   Problem Relation Age of Onset     HEART DISEASE Maternal Grandfather      C.A.D. Maternal Grandfather      Asthma Father      Cerebrovascular Disease Paternal Grandfather      Asthma Sister          Current Outpatient Prescriptions   Medication Sig Dispense Refill     OMEPRAZOLE PO Take 20 mg by mouth       Probiotic Product (PROBIOTIC PO)        Allergies   Allergen Reactions     Shellfish Allergy Difficulty breathing     Hives, visual disturbance     Recent Labs   Lab Test  07/09/18   1407  06/29/16   1610   11/25/14   0946  01/24/11   1508   LDL  Cannot estimate LDL when triglyceride exceeds 400 mg/dL  89   --    --    --    --    HDL  34*   --    --    --    --    TRIG  408*   --    --    --    --    ALT  76*   --    --   12  14   CR  1.05  0.99   < >  0.92  1.03   GFRESTIMATED  77  83   < >  >90  Non  GFR Calc    83   GFRESTBLACK  >90  >90  African American GFR Calc     < >  >90   GFR Calc    >90   POTASSIUM  3.9  4.6   < >  4.4  3.6   TSH  1.68   --    --    --    --     < > = values in this interval not displayed.      BP Readings from Last 3 Encounters:   07/09/18 133/89   01/18/18 136/89   11/01/16 132/86    Wt Readings from Last 3 Encounters:   07/09/18 207 lb 12 oz (94.2 kg)   01/18/18 207 lb 12 oz (94.2 kg)   11/01/16 204 lb (92.5 kg)                  Labs reviewed in EPIC    Reviewed and updated as needed this visit by clinical staff  Tobacco  Allergies  Meds  Med Hx  Surg Hx  Fam Hx  Soc Hx      Reviewed and updated as needed this visit by Provider         ROS:  Constitutional, HEENT, cardiovascular, pulmonary, GI, , musculoskeletal, neuro, skin, endocrine and psych systems are negative, except as  "otherwise noted.    OBJECTIVE:     /89 (BP Location: Left arm, Patient Position: Chair, Cuff Size: Adult Large)  Pulse 55  Temp 98.7  F (37.1  C) (Oral)  Resp 16  Ht 6' 1.5\" (1.867 m)  Wt 207 lb 12 oz (94.2 kg)  SpO2 96%  BMI 27.04 kg/m2  Body mass index is 27.04 kg/(m^2).  GENERAL: healthy, alert and no distress  EYES: Eyes grossly normal to inspection, PERRL and conjunctivae and sclerae normal  HENT: ear canals and TM's normal, nose and mouth without ulcers or lesions  NECK: no adenopathy, no asymmetry, masses, or scars and thyroid normal to palpation  RESP: lungs clear to auscultation - no rales, rhonchi or wheezes  CV: regular rate and rhythm, normal S1 S2, no S3 or S4, no murmur, click or rub, no peripheral edema and peripheral pulses strong  ABDOMEN: soft, non tender, no hepatosplenomegaly, no masses and bowel sounds normal  MS: no gross musculoskeletal defects noted, no edema  SKIN: no suspicious lesions or rashes  NEURO: Normal strength and tone, mentation intact and speech normal  PSYCH: mentation appears normal, affect normal/bright, fatigued, judgement and insight intact and appearance well groomed    Diagnostic Test Results:  Results for orders placed or performed in visit on 07/09/18   CBC with platelets differential   Result Value Ref Range    WBC 6.2 4.0 - 11.0 10e9/L    RBC Count 5.09 4.4 - 5.9 10e12/L    Hemoglobin 15.4 13.3 - 17.7 g/dL    Hematocrit 44.9 40.0 - 53.0 %    MCV 88 78 - 100 fl    MCH 30.3 26.5 - 33.0 pg    MCHC 34.3 31.5 - 36.5 g/dL    RDW 13.6 10.0 - 15.0 %    Platelet Count 185 150 - 450 10e9/L    Diff Method Automated Method     % Neutrophils 59.7 %    % Lymphocytes 25.4 %    % Monocytes 12.5 %    % Eosinophils 2.1 %    % Basophils 0.3 %    Absolute Neutrophil 3.7 1.6 - 8.3 10e9/L    Absolute Lymphocytes 1.6 0.8 - 5.3 10e9/L    Absolute Monocytes 0.8 0.0 - 1.3 10e9/L    Absolute Eosinophils 0.1 0.0 - 0.7 10e9/L    Absolute Basophils 0.0 0.0 - 0.2 10e9/L   Comprehensive " metabolic panel   Result Value Ref Range    Sodium 139 133 - 144 mmol/L    Potassium 3.9 3.4 - 5.3 mmol/L    Chloride 105 94 - 109 mmol/L    Carbon Dioxide 21 20 - 32 mmol/L    Anion Gap 13 3 - 14 mmol/L    Glucose 93 70 - 99 mg/dL    Urea Nitrogen 16 7 - 30 mg/dL    Creatinine 1.05 0.66 - 1.25 mg/dL    GFR Estimate 77 >60 mL/min/1.7m2    GFR Estimate If Black >90 >60 mL/min/1.7m2    Calcium 9.1 8.5 - 10.1 mg/dL    Bilirubin Total 0.6 0.2 - 1.3 mg/dL    Albumin 4.3 3.4 - 5.0 g/dL    Protein Total 8.4 6.8 - 8.8 g/dL    Alkaline Phosphatase 65 40 - 150 U/L    ALT 76 (H) 0 - 70 U/L     (H) 0 - 45 U/L   TSH with free T4 reflex   Result Value Ref Range    TSH 1.68 0.40 - 4.00 mU/L   Lipid panel reflex to direct LDL Fasting   Result Value Ref Range    Cholesterol 168 <200 mg/dL    Triglycerides 408 (H) <150 mg/dL    HDL Cholesterol 34 (L) >39 mg/dL    LDL Cholesterol Calculated  <100 mg/dL     Cannot estimate LDL when triglyceride exceeds 400 mg/dL    Non HDL Cholesterol 134 (H) <130 mg/dL   HIV Antigen Antibody Combo   Result Value Ref Range    HIV Antigen Antibody Combo Nonreactive NR^Nonreactive       Lipase   Result Value Ref Range    Lipase 113 73 - 393 U/L   ESR: Erythrocyte sedimentation rate   Result Value Ref Range    Sed Rate 5 0 - 15 mm/h   CRP, inflammation   Result Value Ref Range    CRP Inflammation <2.9 0.0 - 8.0 mg/L   LDL cholesterol direct   Result Value Ref Range    LDL Cholesterol Direct 89 <100 mg/dL       ASSESSMENT/PLAN:     1. Diarrhea, unspecified type  DDX: infectious ( [place] in [place], may have eaten at one of affected restaurants, vs C diff ( works in hospital and also on chronic PPI's, vs something picked up when in cabin or ate fish, versus something else, wife recently also with diarrhea past 3 days though his going on 1 month.) versus functional versus something else. Unclear etiology/diagnosis with uncertain prognosis requiring further workup below. Exam benign, vitals  stable. Keep up with fluids. Labs today shows mild elevated liver tests. These came after he left. Recheck in 2 weeks, avoid tylenol and alcohol . If still elevated will do Hepatitis serologies and liver u/S. Could be related to elevated TG. Stool tests to evaluate cause including C diff. If negative can use imodium to control symptoms. See GI if no answers and not better especially as previously had similar symptoms and tubular adenoma removed at last colonoscopy for diarrhea in the past. Discussed that Chronic use of Prilosec like med's can cause atypical pneumonia, fractures,  C diff colitis, vit B 12 and magnesium deficiency. If can come off would be good. Best way to do it is to start zantac over the counter 150 mg twice a day and then after few weeks on it start tapering off Prilosec until off it completely while continuing zantac twice a day for symptoms control  Consider doing cholesterol screening and HIV test. Go to the ER if worse. Continue routine care with primary Dr Wegener.  - CBC with platelets differential  - Comprehensive metabolic panel  - TSH with free T4 reflex  - HIV Antigen Antibody Combo  - Cryptosporidium Antigen by EIA Stool; Future  - Ova and Parasite Exam Routine; Future  - Clostridium difficile Toxin B PCR; Future  - Cryptosporidium in stool, stain; Future  - Enteric Bacteria and Virus Panel by [facility] Stool; Future  - Lipase  - GASTROENTEROLOGY ADULT REF CONSULT ONLY  - LDL cholesterol direct    2. Upper abdominal pain  No acute abdomen. Could be gastritis and flare up of his reflux. Avoid spicy food, alcohol, acetaminophen , NSAID, try changing Prilosec to zantac BID. Lipase normal makes pancreatitis less likely as cause though has elevated TG which could predispose him to that. Mildly elevated LFT without hyperbilirubinemia. Recheck in 2 weeks. If still up will do hepatitis serologies and liver ultrasound. See GI if not better. Go to the ER if worse.   - CBC with platelets differential  -  Comprehensive metabolic panel  - TSH with free T4 reflex  - Lipid panel reflex to direct LDL Fasting  - Cryptosporidium Antigen by EIA Stool; Future  - Ova and Parasite Exam Routine; Future  - Clostridium difficile Toxin B PCR; Future  - Cryptosporidium in stool, stain; Future  - Enteric Bacteria and Virus Panel by [facility] Stool; Future  - Lipase  - GASTROENTEROLOGY ADULT REF CONSULT ONLY  - ESR: Erythrocyte sedimentation rate  - CRP, inflammation    3. Heart burn  [place] diet, avoid things that make it worse. Chronic use of Prilosec like med's can cause atypical pneumonia, fractures,  C diff colitis, vit B 12 and magnesium deficiency. If can come off would be good. Best way to to do it is to start zantac over the counter 150 mg twice a day and then after few weeks on it start tapering off Prilosec until off it completely while continuing zantac twice a day for symptoms control  - CBC with platelets differential  - Comprehensive metabolic panel  - TSH with free T4 reflex  - Lipid panel reflex to direct LDL Fasting  - Cryptosporidium Antigen by EIA Stool; Future  - Ova and Parasite Exam Routine; Future  - Clostridium difficile Toxin B PCR; Future  - Cryptosporidium in stool, stain; Future  - Enteric Bacteria and Virus Panel by [facility] Stool; Future  - Lipase  - GASTROENTEROLOGY ADULT REF CONSULT ONLY  - ESR: Erythrocyte sedimentation rate  - CRP, inflammation    4. Tubular adenoma  Hx of . See GI for recheck colonoscopy if diarrhea persists.     5. Encounter for screening for HIV  - HIV Antigen Antibody Combo    6. Encounter for screening for cardiovascular disorders  TG came back quite high. Is non fasting. consider rechecking fasting in 6 months.   - Lipid panel reflex to direct LDL Fasting    See Patient Instructions    Bettye Quinn MD  [facility]

## 2018-07-09 NOTE — LETTER
July 10, 2018      Jerry Hernandez  [address] [address]        Dear ,    We are writing to inform you of your test results.    Normal LDL.     Resulted Orders   CBC with platelets differential   Result Value Ref Range    WBC 6.2 4.0 - 11.0 10e9/L    RBC Count 5.09 4.4 - 5.9 10e12/L    Hemoglobin 15.4 13.3 - 17.7 g/dL    Hematocrit 44.9 40.0 - 53.0 %    MCV 88 78 - 100 fl    MCH 30.3 26.5 - 33.0 pg    MCHC 34.3 31.5 - 36.5 g/dL    RDW 13.6 10.0 - 15.0 %    Platelet Count 185 150 - 450 10e9/L    Diff Method Automated Method     % Neutrophils 59.7 %    % Lymphocytes 25.4 %    % Monocytes 12.5 %    % Eosinophils 2.1 %    % Basophils 0.3 %    Absolute Neutrophil 3.7 1.6 - 8.3 10e9/L    Absolute Lymphocytes 1.6 0.8 - 5.3 10e9/L    Absolute Monocytes 0.8 0.0 - 1.3 10e9/L    Absolute Eosinophils 0.1 0.0 - 0.7 10e9/L    Absolute Basophils 0.0 0.0 - 0.2 10e9/L   Comprehensive metabolic panel   Result Value Ref Range    Sodium 139 133 - 144 mmol/L    Potassium 3.9 3.4 - 5.3 mmol/L    Chloride 105 94 - 109 mmol/L    Carbon Dioxide 21 20 - 32 mmol/L    Anion Gap 13 3 - 14 mmol/L    Glucose 93 70 - 99 mg/dL      Comment:      Non Fasting    Urea Nitrogen 16 7 - 30 mg/dL    Creatinine 1.05 0.66 - 1.25 mg/dL    GFR Estimate 77 >60 mL/min/1.7m2      Comment:      Non  GFR Calc    GFR Estimate If Black >90 >60 mL/min/1.7m2      Comment:       GFR Calc    Calcium 9.1 8.5 - 10.1 mg/dL    Bilirubin Total 0.6 0.2 - 1.3 mg/dL    Albumin 4.3 3.4 - 5.0 g/dL    Protein Total 8.4 6.8 - 8.8 g/dL    Alkaline Phosphatase 65 40 - 150 U/L    ALT 76 (H) 0 - 70 U/L     (H) 0 - 45 U/L   TSH with free T4 reflex   Result Value Ref Range    TSH 1.68 0.40 - 4.00 mU/L   Lipid panel reflex to direct LDL Fasting   Result Value Ref Range    Cholesterol 168 <200 mg/dL    Triglycerides 408 (H) <150 mg/dL      Comment:      Borderline high:  150-199 mg/dl  High:             200-499  mg/dl  Very high:       >499 mg/dl  Non Fasting      HDL Cholesterol 34 (L) >39 mg/dL    LDL Cholesterol Calculated  <100 mg/dL     Cannot estimate LDL when triglyceride exceeds 400 mg/dL    Non HDL Cholesterol 134 (H) <130 mg/dL      Comment:      Above Desirable:  130-159 mg/dl  Borderline high:  160-189 mg/dl  High:             190-219 mg/dl  Very high:       >219 mg/dl     HIV Antigen Antibody Combo   Result Value Ref Range    HIV Antigen Antibody Combo Nonreactive NR^Nonreactive          Comment:      HIV-1 p24 Ag & HIV-1/HIV-2 Ab Not Detected   Lipase   Result Value Ref Range    Lipase 113 73 - 393 U/L   ESR: Erythrocyte sedimentation rate   Result Value Ref Range    Sed Rate 5 0 - 15 mm/h   CRP, inflammation   Result Value Ref Range    CRP Inflammation <2.9 0.0 - 8.0 mg/L   LDL cholesterol direct   Result Value Ref Range    LDL Cholesterol Direct 89 <100 mg/dL      Comment:      Desirable:       <100 mg/dl       If you have any questions or concerns, please call the clinic at the number listed above.       Sincerely,        Bettye Quinn MD/nr

## 2018-07-09 NOTE — PATIENT INSTRUCTIONS
Labs today  Stool tests to evaluate cause including c diff  See GI if no answers and not better  Chronic use of Prilosec like med's can cause atypical pneumonia, fractures,  C diff colitis, vit B 12 and magnesium deficiency. If can come off would be good. Best way to to do it is to start zantac over the counter 150 mg twice a day and then after few weeks on it start tapering off Prilosec until off it completely while continuing zantac twice a day for symptoms control  Consider doing cholesterol screening and HIV test  Go to the ER if worse  Continue routine care with primary Dr Wegener

## 2018-07-09 NOTE — LETTER
July 13, 2018      Jerry Hernandez  [address] [address]        Dear ,    We are writing to inform you of your test results.    Negative for cryptosporidium.   So far all stool tests normal  If symptoms persist please see GI    Resulted Orders   Cryptosporidium Antigen by EIA Stool   Result Value Ref Range    Cryptosporidium Antigen EIA Negative Negative      Comment:      (Note)  Performed by [facility],  [address] [place] [id number] [phone]  [email or web address], Choco Navarrete MD, Lab. Director     Ova and Parasite Exam Routine   Result Value Ref Range    Specimen Description Feces     Ova and Parasite Exam Routine parasitology exam negative     Ova and Parasite Exam       Cryptosporidium, Cyclospora, and Microsporidia are not readily detected by this method. A   single negative specimen does not rule out parasitic infection.     Clostridium difficile Toxin B PCR   Result Value Ref Range    Specimen Description Feces     C Diff Toxin B PCR Negative NEG^Negative      Comment:      Negative: Clostridium difficile target DNA sequences NOT detected, presumed   negative for Clostridium difficile toxin B or the number of bacteria present   may be below the limit of detection for the test.  FDA approved assay performed using [employer] GeneXpert real-time PCR.  A negative result does not exclude actual disease due to Clostridium difficile   and may be due to improper collection, handling and storage of the specimen   or the number of organisms in the specimen is below the detection limit of the   assay.     Cryptosporidium in stool, stain   Result Value Ref Range    Specimen Description Feces     Cryptosporidium Stain Stool       No oocysts of Cryptosporidium species, Cyclospora cayetanensis, or Cystoisospora   (Isospora) rudy found      Cryptosporidium Stain Stool       A modified acid fast stain reveals no oocysts of Cryptosporidium, Cyclospora, or   Cystoisospora  (Isospora). Consider other causes for symptoms      Cryptosporidium Stain Stool       Lucille Tian M.D., Assistant Medical Director  7/11/18     Enteric Bacteria and Virus Panel by [facility] Stool   Result Value Ref Range    Campylobacter group by NAILA Not Detected NDET^Not Detected    Salmonella species by NAILA Not Detected NDET^Not Detected    Shigella species by NAILA Not Detected NDET^Not Detected    Vibrio group by NAILA Not Detected NDET^Not Detected    Rotavirus A by NAILA Not Detected NDET^Not Detected    Shiga toxin 1 gene by NAILA Not Detected NDET^Not Detected    Shiga toxin 2 gene by NAILA Not Detected NDET^Not Detected    Norovirus I and II by NAILA Not Detected NDET^Not Detected    Yersinia enterocolitica by NAILA Not Detected NDET^Not Detected    Enteric pathogen comment       Testing performed by multiplexed, qualitative PCR using the [employer] Enteric   Pathogens Nucleic Acid Test. Results should not be used as the sole basis for diagnosis,   treatment, or other patient management decisions.        Comment:      Positive results do not rule out co-infection with other organisms that are   not detected by this test, and may not be the sole or definitive cause of   patient illness.   Negative results in the setting of clinical illness compatible with   gastroenteritis may be due to infection by pathogens that are not detected by   this test or non-infectious causes such as ulcerative colitis, irritable bowel   syndrome, or Crohn's disease.   Note: Shiga toxin producing E. coli (STEC) typically harbor one or both genes   that encode for Shiga toxins 1 and 2.       If you have any questions or concerns, please call the clinic at the number listed above.   Sincerely,  Bettye Quinn MD/nr

## 2018-07-09 NOTE — LETTER
July 11, 2018      Jerry Hernandez  [address] [address]        Dear ,    We are writing to inform you of your test results.    So far negative for c diff. Can use imodium as needed to help with diarrhea. Eat a probiotic daily    Resulted Orders   Clostridium difficile Toxin B PCR   Result Value Ref Range    Specimen Description Feces     C Diff Toxin B PCR Negative NEG^Negative      Comment:      Negative: Clostridium difficile target DNA sequences NOT detected, presumed   negative for Clostridium difficile toxin B or the number of bacteria present   may be below the limit of detection for the test.  FDA approved assay performed using [employer] GeneXpert real-time PCR.  A negative result does not exclude actual disease due to Clostridium difficile   and may be due to improper collection, handling and storage of the specimen   or the number of organisms in the specimen is below the detection limit of the   assay.         If you have any questions or concerns, please call the clinic at the number listed above.       Sincerely,    Bettye Quinn MD/nr

## 2018-07-09 NOTE — LETTER
July 10, 2018      Jerry Hernandez  [address] [address]        Dear ,    We are writing to inform you of your test results.    Normal sed rate and CBC    Resulted Orders   CBC with platelets differential   Result Value Ref Range    WBC 6.2 4.0 - 11.0 10e9/L    RBC Count 5.09 4.4 - 5.9 10e12/L    Hemoglobin 15.4 13.3 - 17.7 g/dL    Hematocrit 44.9 40.0 - 53.0 %    MCV 88 78 - 100 fl    MCH 30.3 26.5 - 33.0 pg    MCHC 34.3 31.5 - 36.5 g/dL    RDW 13.6 10.0 - 15.0 %    Platelet Count 185 150 - 450 10e9/L    Diff Method Automated Method     % Neutrophils 59.7 %    % Lymphocytes 25.4 %    % Monocytes 12.5 %    % Eosinophils 2.1 %    % Basophils 0.3 %    Absolute Neutrophil 3.7 1.6 - 8.3 10e9/L    Absolute Lymphocytes 1.6 0.8 - 5.3 10e9/L    Absolute Monocytes 0.8 0.0 - 1.3 10e9/L    Absolute Eosinophils 0.1 0.0 - 0.7 10e9/L    Absolute Basophils 0.0 0.0 - 0.2 10e9/L   Comprehensive metabolic panel   Result Value Ref Range    Sodium 139 133 - 144 mmol/L    Potassium 3.9 3.4 - 5.3 mmol/L    Chloride 105 94 - 109 mmol/L    Carbon Dioxide 21 20 - 32 mmol/L    Anion Gap 13 3 - 14 mmol/L    Glucose 93 70 - 99 mg/dL      Comment:      Non Fasting    Urea Nitrogen 16 7 - 30 mg/dL    Creatinine 1.05 0.66 - 1.25 mg/dL    GFR Estimate 77 >60 mL/min/1.7m2      Comment:      Non  GFR Calc    GFR Estimate If Black >90 >60 mL/min/1.7m2      Comment:       GFR Calc    Calcium 9.1 8.5 - 10.1 mg/dL    Bilirubin Total 0.6 0.2 - 1.3 mg/dL    Albumin 4.3 3.4 - 5.0 g/dL    Protein Total 8.4 6.8 - 8.8 g/dL    Alkaline Phosphatase 65 40 - 150 U/L    ALT 76 (H) 0 - 70 U/L     (H) 0 - 45 U/L   TSH with free T4 reflex   Result Value Ref Range    TSH 1.68 0.40 - 4.00 mU/L   Lipid panel reflex to direct LDL Fasting   Result Value Ref Range    Cholesterol 168 <200 mg/dL    Triglycerides 408 (H) <150 mg/dL      Comment:      Borderline high:  150-199 mg/dl  High:              200-499 mg/dl  Very high:       >499 mg/dl  Non Fasting      HDL Cholesterol 34 (L) >39 mg/dL    LDL Cholesterol Calculated  <100 mg/dL     Cannot estimate LDL when triglyceride exceeds 400 mg/dL    Non HDL Cholesterol 134 (H) <130 mg/dL      Comment:      Above Desirable:  130-159 mg/dl  Borderline high:  160-189 mg/dl  High:             190-219 mg/dl  Very high:       >219 mg/dl     HIV Antigen Antibody Combo   Result Value Ref Range    HIV Antigen Antibody Combo Nonreactive NR^Nonreactive          Comment:      HIV-1 p24 Ag & HIV-1/HIV-2 Ab Not Detected   Lipase   Result Value Ref Range    Lipase 113 73 - 393 U/L   ESR: Erythrocyte sedimentation rate   Result Value Ref Range    Sed Rate 5 0 - 15 mm/h   CRP, inflammation   Result Value Ref Range    CRP Inflammation <2.9 0.0 - 8.0 mg/L   LDL cholesterol direct   Result Value Ref Range    LDL Cholesterol Direct 89 <100 mg/dL      Comment:      Desirable:       <100 mg/dl       If you have any questions or concerns, please call the clinic at the number listed above.       Sincerely,        Bettye Quinn MD/nr

## 2018-07-09 NOTE — MR AVS SNAPSHOT
After Visit Summary   7/9/2018    Jerry Hernandez    MRN: [id number]           Patient Information     Date Of Birth          1975        Visit Information        Provider Department      7/9/2018 1:40 PM Bettye Quinn MD [facility] [place]        Today's Diagnoses     Diarrhea, unspecified type    -  1    Encounter for screening for cardiovascular disorders        Encounter for screening for HIV        Upper abdominal pain        Heart burn          Care Instructions    Labs today  Stool tests to evaluate cause including c diff  See GI if no answers and not better  Chronic use of Prilosec like med's can cause atypical pneumonia, fractures,  C diff colitis, vit B 12 and magnesium deficiency. If can come off would be good. Best way to to do it is to start zantac over the counter 150 mg twice a day and then after few weeks on it start tapering off Prilosec until off it completely while continuing zantac twice a day for symptoms control  Consider doing cholesterol screening and HIV test  Go to the ER if worse  Continue routine care with primary Dr Wegener          Follow-ups after your visit        Additional Services     GASTROENTEROLOGY ADULT REF CONSULT ONLY       Preferred Location: [facility] [phone], [facility]: [phone] and [facility] [phone]      Please be aware that coverage of these services is subject to the terms and limitations of your health insurance plan.  Call member services at your health plan with any benefit or coverage questions.  Any procedures must be performed at a [place] facility OR coordinated by your clinic's referral office.    Please bring the following with you to your appointment:    (1) Any X-Rays, CTs or MRIs which have been performed.  Contact the facility where they were done to arrange for  prior to your scheduled appointment.    (2) List of current medications   (3) This referral request   (4) Any  "documents/labs given to you for this referral                  Future tests that were ordered for you today     Open Future Orders        Priority Expected Expires Ordered    Cryptosporidium Antigen by EIA Stool Routine  2019    Ova and Parasite Exam Routine Routine  2019    Clostridium difficile Toxin B PCR Routine  2018    Cryptosporidium in stool, stain Routine  2019    Enteric Bacteria and Virus Panel by NAILA Stool Routine  2019            Who to contact     If you have questions or need follow up information about today's clinic visit or your schedule please contact [facility] directly at [phone].  Normal or non-critical lab and imaging results will be communicated to you by Contourhart, letter or phone within 4 business days after the clinic has received the results. If you do not hear from us within 7 days, please contact the clinic through Contourhart or phone. If you have a critical or abnormal lab result, we will notify you by phone as soon as possible.  Submit refill requests through Collect or call your pharmacy and they will forward the refill request to us. Please allow 3 business days for your refill to be completed.          Additional Information About Your Visit        ContourharShut Down Information     Collect lets you send messages to your doctor, view your test results, renew your prescriptions, schedule appointments and more. To sign up, go to [email or web address] . Click on \"Log in\" on the left side of the screen, which will take you to the Welcome page. Then click on \"Sign up Now\" on the right side of the page.     You will be asked to enter the access code listed below, as well as some personal information. Please follow the directions to create your username and password.     Your access code is: [email or web address]  Expires: 10/7/2018  2:04 PM     Your access code will  in 90 days. If you need help or a new code, please " "call your [place] clinic or [phone].        Care EveryWhere ID     This is your Care EveryWhere ID. This could be used by other organizations to access your [place] medical records  [id number]        Your Vitals Were     Pulse Temperature Respirations Height Pulse Oximetry BMI (Body Mass Index)    55 98.7  F (37.1  C) (Oral) 16 6' 1.5\" (1.867 m) 96% 27.04 kg/m2       Blood Pressure from Last 3 Encounters:   07/09/18 133/89   01/18/18 136/89   11/01/16 132/86    Weight from Last 3 Encounters:   07/09/18 207 lb 12 oz (94.2 kg)   01/18/18 207 lb 12 oz (94.2 kg)   11/01/16 204 lb (92.5 kg)              We Performed the Following     CBC with platelets differential     Comprehensive metabolic panel     CRP, inflammation     ESR: Erythrocyte sedimentation rate     GASTROENTEROLOGY ADULT REF CONSULT ONLY     HIV Antigen Antibody Combo     Lipase     Lipid panel reflex to direct LDL Fasting     TSH with free T4 reflex        Primary Care Provider Office Phone # Fax #    Joel Daniel Irwin Wegener, [phone]       [address]  [place] [address]        Equal Access to Services     MICHAEL SIMEON AH: Hadii ferdinand hughes hadfidelinao Sojeffreyali, waaxda luqadaha, qaybta kaalmada adeegyada, shady low. So [facility] [phone].    ATENCIÓN: Si habla español, tiene a zhang disposición servicios gratuitos de asistencia lingüística. Llame al [phone].    We comply with applicable federal civil rights laws and [place] laws. We do not discriminate on the basis of race, color, national origin, age, disability, sex, sexual orientation, or gender identity.            Thank you!     Thank you for choosing [facility]  for your care. Our goal is always to provide you with excellent care. Hearing back from our patients is one way we can continue to improve our services. Please take a few minutes to complete the written survey that you may receive in the mail after your visit with us. " Thank you!             Your Updated Medication List - Protect others around you: Learn how to safely use, store and throw away your medicines at www.disposemymeds.org.          This list is accurate as of 7/9/18  2:04 PM.  Always use your most recent med list.                   Brand Name Dispense Instructions for use Diagnosis    OMEPRAZOLE PO      Take 20 mg by mouth        PROBIOTIC PO

## 2018-07-10 ENCOUNTER — TELEPHONE (OUTPATIENT)
Dept: FAMILY MEDICINE | Facility: CLINIC | Age: 43
End: 2018-07-10

## 2018-07-10 DIAGNOSIS — R74.8 ELEVATED LIVER ENZYMES: Primary | ICD-10-CM

## 2018-07-10 LAB
ALBUMIN SERPL-MCNC: 4.3 G/DL (ref 3.4–5)
ALP SERPL-CCNC: 65 U/L (ref 40–150)
ALT SERPL W P-5'-P-CCNC: 76 U/L (ref 0–70)
ANION GAP SERPL CALCULATED.3IONS-SCNC: 13 MMOL/L (ref 3–14)
AST SERPL W P-5'-P-CCNC: 102 U/L (ref 0–45)
BILIRUB SERPL-MCNC: 0.6 MG/DL (ref 0.2–1.3)
BUN SERPL-MCNC: 16 MG/DL (ref 7–30)
C COLI+JEJUNI+LARI FUSA STL QL NAA+PROBE: NOT DETECTED
C DIFF TOX B STL QL: NEGATIVE
CALCIUM SERPL-MCNC: 9.1 MG/DL (ref 8.5–10.1)
CHLORIDE SERPL-SCNC: 105 MMOL/L (ref 94–109)
CHOLEST SERPL-MCNC: 168 MG/DL
CO2 SERPL-SCNC: 21 MMOL/L (ref 20–32)
CREAT SERPL-MCNC: 1.05 MG/DL (ref 0.66–1.25)
EC STX1 GENE STL QL NAA+PROBE: NOT DETECTED
EC STX2 GENE STL QL NAA+PROBE: NOT DETECTED
ENTERIC PATHOGEN COMMENT: NORMAL
GFR SERPL CREATININE-BSD FRML MDRD: 77 ML/MIN/1.7M2
GLUCOSE SERPL-MCNC: 93 MG/DL (ref 70–99)
HDLC SERPL-MCNC: 34 MG/DL
HIV 1+2 AB+HIV1 P24 AG SERPL QL IA: NONREACTIVE
LDLC SERPL CALC-MCNC: ABNORMAL MG/DL
LDLC SERPL DIRECT ASSAY-MCNC: 89 MG/DL
NONHDLC SERPL-MCNC: 134 MG/DL
NOROV GI+II ORF1-ORF2 JNC STL QL NAA+PR: NOT DETECTED
POTASSIUM SERPL-SCNC: 3.9 MMOL/L (ref 3.4–5.3)
PROT SERPL-MCNC: 8.4 G/DL (ref 6.8–8.8)
RVA NSP5 STL QL NAA+PROBE: NOT DETECTED
SALMONELLA SP RPOD STL QL NAA+PROBE: NOT DETECTED
SHIGELLA SP+EIEC IPAH STL QL NAA+PROBE: NOT DETECTED
SODIUM SERPL-SCNC: 139 MMOL/L (ref 133–144)
SPECIMEN SOURCE: NORMAL
TRIGL SERPL-MCNC: 408 MG/DL
TSH SERPL DL<=0.005 MIU/L-ACNC: 1.68 MU/L (ref 0.4–4)
V CHOL+PARA RFBL+TRKH+TNAA STL QL NAA+PR: NOT DETECTED
Y ENTERO RECN STL QL NAA+PROBE: NOT DETECTED

## 2018-07-10 NOTE — TELEPHONE ENCOUNTER
cmp shows elevated livertests, avoid alcohol , avoid tylenol. recheck in 2 weeks to see trend) lft's)   Thyroid normal  awaiting stool test  Can close encounter when done

## 2018-07-11 LAB
O+P STL MICRO: NORMAL
O+P STL MICRO: NORMAL
SPECIMEN SOURCE: NORMAL

## 2018-07-12 LAB
CRYPTOSP AG STL QL IA: NEGATIVE
O+P STL CONC: NORMAL
SPECIMEN SOURCE: NORMAL

## 2018-07-23 DIAGNOSIS — R74.8 ELEVATED LIVER ENZYMES: ICD-10-CM

## 2018-07-23 LAB
ALBUMIN SERPL-MCNC: 3.8 G/DL (ref 3.4–5)
ALP SERPL-CCNC: 60 U/L (ref 40–150)
ALT SERPL W P-5'-P-CCNC: 41 U/L (ref 0–70)
AST SERPL W P-5'-P-CCNC: 28 U/L (ref 0–45)
BILIRUB DIRECT SERPL-MCNC: <0.1 MG/DL (ref 0–0.2)
BILIRUB SERPL-MCNC: 0.3 MG/DL (ref 0.2–1.3)
PROT SERPL-MCNC: 7.4 G/DL (ref 6.8–8.8)

## 2018-07-23 PROCEDURE — 36415 COLL VENOUS BLD VENIPUNCTURE: CPT | Performed by: FAMILY MEDICINE

## 2018-07-23 PROCEDURE — 80076 HEPATIC FUNCTION PANEL: CPT | Performed by: FAMILY MEDICINE

## 2018-07-23 NOTE — PROGRESS NOTES
Results within acceptable limits.  -Liver and gallbladder tests (ALT,AST, Alk phos,bilirubin) are normal.  Hope you are feeling better .

## 2018-07-23 NOTE — LETTER
July 24, 2018      Jerry Hernandez  [address] [address]        Dear ,    We are writing to inform you of your test results.    Results within acceptable limits.  -Liver and gallbladder tests (ALT,AST, Alk phos,bilirubin) are normal.  Hope you are feeling better .    Resulted Orders   Hepatic panel (Albumin, ALT, AST, Bili, Alk Phos, TP)   Result Value Ref Range    Bilirubin Direct <0.1 0.0 - 0.2 mg/dL    Bilirubin Total 0.3 0.2 - 1.3 mg/dL    Albumin 3.8 3.4 - 5.0 g/dL    Protein Total 7.4 6.8 - 8.8 g/dL    Alkaline Phosphatase 60 40 - 150 U/L    ALT 41 0 - 70 U/L    AST 28 0 - 45 U/L       If you have any questions or concerns, please call the clinic at the number listed above.       Sincerely,    Bettye Quinn MD/nr

## 2019-08-05 ENCOUNTER — TELEPHONE (OUTPATIENT)
Dept: FAMILY MEDICINE | Facility: CLINIC | Age: 44
End: 2019-08-05

## 2019-08-05 NOTE — TELEPHONE ENCOUNTER
Patient saids he was bit by something on Friday and it is still hurting doesn't know if it was a bee or a spider
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
## 2020-02-12 ENCOUNTER — OFFICE VISIT (OUTPATIENT)
Dept: FAMILY MEDICINE | Facility: CLINIC | Age: 45
End: 2020-02-12
Payer: COMMERCIAL

## 2020-02-12 VITALS
HEART RATE: 66 BPM | BODY MASS INDEX: 26.77 KG/M2 | SYSTOLIC BLOOD PRESSURE: 118 MMHG | TEMPERATURE: 98.2 F | OXYGEN SATURATION: 97 % | DIASTOLIC BLOOD PRESSURE: 80 MMHG | HEIGHT: 73 IN | WEIGHT: 202 LBS | RESPIRATION RATE: 16 BRPM

## 2020-02-12 DIAGNOSIS — R74.8 ELEVATED LIVER ENZYMES: ICD-10-CM

## 2020-02-12 DIAGNOSIS — Z00.00 ROUTINE GENERAL MEDICAL EXAMINATION AT A HEALTH CARE FACILITY: Primary | ICD-10-CM

## 2020-02-12 DIAGNOSIS — Z13.6 CARDIOVASCULAR SCREENING; LDL GOAL LESS THAN 160: ICD-10-CM

## 2020-02-12 PROCEDURE — 99396 PREV VISIT EST AGE 40-64: CPT | Performed by: FAMILY MEDICINE

## 2020-02-12 ASSESSMENT — ENCOUNTER SYMPTOMS
CHILLS: 0
DIZZINESS: 0
CONSTIPATION: 0
ABDOMINAL PAIN: 0
NERVOUS/ANXIOUS: 0
HEMATURIA: 0
DIARRHEA: 0
EYE PAIN: 0
COUGH: 0
HEMATOCHEZIA: 0
FEVER: 0

## 2020-02-12 ASSESSMENT — MIFFLIN-ST. JEOR: SCORE: 1865.15

## 2020-02-12 NOTE — LETTER
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         Joel Daniel Irwin Wegener MD

## 2020-02-12 NOTE — PROGRESS NOTES
SUBJECTIVE:   CC: Jerry Hernandez is an 44 year old male who presents for preventative health visit.     Healthy Habits:     Getting at least 3 servings of Calcium per day:  Yes    Bi-annual eye exam:  Yes    Dental care twice a year:  Yes    Sleep apnea or symptoms of sleep apnea:  None    Diet:  Regular (no restrictions)    Frequency of exercise:  4-5 days/week    Duration of exercise:  30-45 minutes    Taking medications regularly:  Yes    Medication side effects:  None    PHQ-2 Total Score: 0    Additional concerns today:  No          PROBLEMS TO ADD ON...  -------------------------------------  Overall doing quite well.  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  Exercises with swimming 4-5 times /week mile each time! Mood good     Today's PHQ-2 Score:   PHQ-2 ( 1999 [employer]) 2/12/2020   Q1: Little interest or pleasure in doing things 0   Q2: Feeling down, depressed or hopeless 0   PHQ-2 Score 0   Q1: Little interest or pleasure in doing things Not at all   Q2: Feeling down, depressed or hopeless Not at all   PHQ-2 Score 0       Abuse: Current or Past(Physical, Sexual or Emotional)- No  Do you feel safe in your environment? Yes        Social History     Tobacco Use     Smoking status: Former Smoker     Smokeless tobacco: Never Used   Substance Use Topics     Alcohol use: Yes     Comment: 1-2 beers/month -occ     If you drink alcohol do you typically have >3 drinks per day or >7 drinks per week? No    Alcohol Use 2/12/2020   Prescreen: >3 drinks/day or >7 drinks/week? No   Prescreen: >3 drinks/day or >7 drinks/week? -   No flowsheet data found.    Last PSA: No results found for: PSA    Reviewed orders with patient. Reviewed health maintenance and updated orders accordingly - Yes  Lab work is in process  Labs reviewed in EPIC    Reviewed and updated as needed this  visit by clinical staff  Tobacco  Allergies  Meds  Med Hx  Surg Hx  Fam Hx  Soc Hx        Reviewed and updated as needed this visit by Provider        Past Medical History:   Diagnosis Date     CARDIOVASCULAR SCREENING; LDL GOAL LESS THAN 160 5/9/2010     GERD (gastroesophageal reflux disease) 2005    EGD-2005     Perineal abscess 1/30/2015     Tobacco use disorder 3/21/2013    Using Electronic Cigarettes. Has not smoked in 2 weeks     Tubular adenoma 7/18/2017      Past Surgical History:   Procedure Laterality Date     C APPENDECTOMY  2005     EXAM UNDER ANESTHESIA ANUS N/A 7/8/2015    Procedure: EXAM UNDER ANESTHESIA ANUS;  Surgeon: James Krause MD;  Location: [facility]     EXAM UNDER ANESTHESIA RECTUM N/A 1/30/2015    Procedure: EXAM UNDER ANESTHESIA RECTUM;  Surgeon: James Krause MD;  Location: [facility] OR     EXAM UNDER ANESTHESIA, FISTULOTOMY RECTUM, COMBINED N/A 4/6/2016    Procedure: COMBINED EXAM UNDER ANESTHESIA, FISTULOTOMY RECTUM;  Surgeon: James Krause MD;  Location: [facility] OR     FISTULOTOMY RECTUM N/A 7/8/2016    Procedure: FISTULOTOMY RECTUM;  Surgeon: James Krause MD;  [facility]     HC ESOPHAGOSCOPY, DIAGNOSTIC  2005    Normal     INCISION AND DRAINAGE RECTUM, COMBINED N/A 1/28/2015    Procedure: COMBINED INCISION AND DRAINAGE RECTUM;  Surgeon: Natasha Roldan MD;  Location: [facility] OR     IRRIGATION AND DEBRIDEMENT RECTUM, COMBINED N/A 1/30/2015    Procedure: COMBINED IRRIGATION AND DEBRIDEMENT RECTUM;  Surgeon: James Krause MD;  Location: [facility] OR     PLACEMENT OF SETON RECTUM N/A 1/30/2015    Procedure: PLACEMENT OF SETON RECTUM;  Surgeon: James Krause MD;  Location: [facility] OR     VASECTOMY  12/2016       Review of Systems   Constitutional: Negative for chills and fever.   HENT: Negative for congestion and ear pain.    Eyes: Negative for pain.   Respiratory: Negative for cough.    Cardiovascular: Negative for chest pain.   Gastrointestinal: Negative for abdominal pain,  "constipation, diarrhea and hematochezia.   Genitourinary: Negative for hematuria.   Neurological: Negative for dizziness.   Psychiatric/Behavioral: The patient is not nervous/anxious.          OBJECTIVE:   /80 (BP Location: Left arm, Patient Position: Sitting, Cuff Size: Adult Regular)   Pulse 66   Temp 98.2  F (36.8  C) (Oral)   Resp 16   Ht 1.854 m (6' 1\")   Wt 91.6 kg (202 lb)   SpO2 97%   BMI 26.65 kg/m      Physical Exam  GENERAL: healthy, alert and no distress  EYES: Eyes grossly normal to inspection, PERRL and conjunctivae and sclerae normal  HENT: ear canals and TM's normal, nose and mouth without ulcers or lesions  NECK: no adenopathy, no asymmetry, masses, or scars and thyroid normal to palpation  RESP: lungs clear to auscultation - no rales, rhonchi or wheezes  CV: regular rate and rhythm, normal S1 S2, no S3 or S4, no murmur, click or rub, no peripheral edema and peripheral pulses strong  ABDOMEN: soft, nontender, no hepatosplenomegaly, no masses and bowel sounds normal   (male): normal male genitalia without lesions or urethral discharge, no hernia  MS: no gross musculoskeletal defects noted, no edema  SKIN: no suspicious lesions or rashes  NEURO: Normal strength and tone, mentation intact and speech normal  PSYCH: mentation appears normal, affect normal/bright    Diagnostic Test Results:  Labs reviewed in Epic    ASSESSMENT/PLAN:   1. Routine general medical examination at a health care facility  Had flu shot already, other immunizations (tdap) utd.     2. Elevated liver enzymes  Re-check attributed to alcohol use on vacation last year.   - Comprehensive metabolic panel; Future    3. CARDIOVASCULAR SCREENING; LDL GOAL LESS THAN 160    - Lipid panel reflex to direct LDL Fasting; Future    COUNSELING:   Reviewed preventive health counseling, as reflected in patient instructions       Regular exercise       Healthy diet/nutrition    Estimated body mass index is 26.65 kg/m  as calculated " "from the following:    Height as of this encounter: 1.854 m (6' 1\").    Weight as of this encounter: 91.6 kg (202 lb).          reports that he has quit smoking. He has never used smokeless tobacco.      Counseling Resources:  ATP IV Guidelines  Pooled Cohorts Equation Calculator  FRAX Risk Assessment  ICSI Preventive Guidelines  Dietary Guidelines for Americans, 2010  USDA's MyPlate  ASA Prophylaxis  Lung CA Screening    Joel Daniel Wegener, MD  [facility]  "

## 2020-04-04 ENCOUNTER — OFFICE VISIT (OUTPATIENT)
Dept: URGENT CARE | Facility: URGENT CARE | Age: 45
End: 2020-04-04
Payer: COMMERCIAL

## 2020-04-04 ENCOUNTER — RESULTS ONLY (OUTPATIENT)
Dept: LAB | Age: 45
End: 2020-04-04

## 2020-04-04 DIAGNOSIS — Z20.822 SUSPECTED COVID-19 VIRUS INFECTION: Primary | ICD-10-CM

## 2020-04-04 PROCEDURE — 99207 ZZC NO BILLABLE SERVICE THIS VISIT: CPT | Performed by: NURSE PRACTITIONER

## 2020-04-04 NOTE — PATIENT INSTRUCTIONS
[facility] Employee [facility] team will receive your Covid 19 test results in the next 1-4 days.  Once your results are received, Employee Health will call you with your test result and discuss your Return to Work timeline and criteria.

## 2020-04-05 LAB
SARS-COV-2 RNA SPEC QL NAA+PROBE: NOT DETECTED
SPECIMEN SOURCE: NORMAL

## 2020-08-18 ENCOUNTER — VIRTUAL VISIT (OUTPATIENT)
Dept: FAMILY MEDICINE | Facility: CLINIC | Age: 45
End: 2020-08-18
Payer: COMMERCIAL

## 2020-08-18 ENCOUNTER — TELEPHONE (OUTPATIENT)
Dept: FAMILY MEDICINE | Facility: CLINIC | Age: 45
End: 2020-08-18

## 2020-08-18 DIAGNOSIS — T24.232A PARTIAL THICKNESS BURN OF LEFT LOWER LEG, INITIAL ENCOUNTER: Primary | ICD-10-CM

## 2020-08-18 PROCEDURE — 99214 OFFICE O/P EST MOD 30 MIN: CPT | Mod: GT | Performed by: NURSE PRACTITIONER

## 2020-08-18 RX ORDER — CEPHALEXIN 500 MG/1
500 CAPSULE ORAL 4 TIMES DAILY
Qty: 40 CAPSULE | Refills: 0 | Status: SHIPPED | OUTPATIENT
Start: 2020-08-18 | End: 2020-08-28

## 2020-08-18 RX ORDER — SILVER SULFADIAZINE 10 MG/G
CREAM TOPICAL DAILY
Qty: 25 G | Refills: 1 | Status: SHIPPED | OUTPATIENT
Start: 2020-08-18 | End: 2022-05-26
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
## 2020-08-20 ENCOUNTER — OFFICE VISIT (OUTPATIENT)
Dept: INTERNAL MEDICINE | Facility: CLINIC | Age: 45
End: 2020-08-20
Payer: COMMERCIAL

## 2020-08-20 VITALS
WEIGHT: 202 LBS | HEART RATE: 59 BPM | SYSTOLIC BLOOD PRESSURE: 122 MMHG | TEMPERATURE: 97.8 F | BODY MASS INDEX: 26.65 KG/M2 | DIASTOLIC BLOOD PRESSURE: 88 MMHG | OXYGEN SATURATION: 98 % | RESPIRATION RATE: 18 BRPM

## 2020-08-20 DIAGNOSIS — T24.132D: Primary | ICD-10-CM

## 2020-08-20 PROCEDURE — 99213 OFFICE O/P EST LOW 20 MIN: CPT | Performed by: INTERNAL MEDICINE
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
## 2020-10-06 ENCOUNTER — APPOINTMENT (OUTPATIENT)
Dept: FAMILY MEDICINE | Facility: CLINIC | Age: 45
End: 2020-10-06
Payer: COMMERCIAL

## 2020-10-06 ENCOUNTER — RESULTS ONLY (OUTPATIENT)
Dept: LAB | Age: 45
End: 2020-10-06

## 2020-10-07 LAB
SARS-COV-2 RNA SPEC QL NAA+PROBE: NOT DETECTED
SPECIMEN SOURCE: NORMAL

## 2021-01-10 ENCOUNTER — HEALTH MAINTENANCE LETTER (OUTPATIENT)
Age: 46
End: 2021-01-10

## 2021-03-13 ENCOUNTER — HEALTH MAINTENANCE LETTER (OUTPATIENT)
Age: 46
End: 2021-03-13

## 2021-06-30 ENCOUNTER — TRANSFERRED RECORDS (OUTPATIENT)
Dept: HEALTH INFORMATION MANAGEMENT | Facility: CLINIC | Age: 46
End: 2021-06-30

## 2021-07-08 NOTE — PROGRESS NOTES
"    Assessment & Plan     Trochanteric bursitis of right hip  Reviewed treatment (icing/nsaids) and prevention of trochanteric bursitis for future reference.     Follow up for prevent physical when able.                BMI:   Estimated body mass index is 25.82 kg/m  as calculated from the following:    Height as of this encounter: 1.854 m (6' 1\").    Weight as of this encounter: 88.8 kg (195 lb 11.2 oz).           No follow-ups on file.    Joel Daniel Wegener, MD  [facility]   Osmany is a 46 year old who presents for the following health issues    HPI     Musculoskeletal problem/pain  Onset/Duration: x6 weeks  Description  Location: hip - right  Joint Swelling: no  Redness: no  Pain: YES  Warmth: no  Intensity:  mild  Progression of Symptoms:  improving  Accompanying signs and symptoms:   Fevers: no  Numbness/tingling/weakness: no  History  Trauma to the area: no   Recent illness:  no  Previous similar problem: YES  Previous evaluation:  YES- dx bursitis, had cortisone injection and XR at [facility] 7/5/21  Precipitating or alleviating factors:  Aggravating factors include heavy lifting  Therapies tried and outcome: rest/inactivity, Ibuprofen and cortisone injection - helpful     Almost cancelled visit.  Saw ortho/had xray (normal he states) and trochanteric steroid injection about 10 days ago.  No pain for 4 days now!     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         Review of Systems         Objective    BP (!) 135/93 (Patient Position: Sitting, Cuff Size: Adult Regular)   Pulse 50   Temp 97.4  F (36.3  C) (Tympanic)   Resp 20   Ht 1.854 m (6' 1\")   Wt 88.8 kg (195 lb 11.2 oz)   SpO2 99%   BMI 25.82 kg/m    Body mass index is 25.82 kg/m .  Physical Exam   Clear speech.                     "

## 2021-07-12 ENCOUNTER — OFFICE VISIT (OUTPATIENT)
Dept: FAMILY MEDICINE | Facility: CLINIC | Age: 46
End: 2021-07-12
Payer: COMMERCIAL

## 2021-07-12 VITALS
OXYGEN SATURATION: 99 % | DIASTOLIC BLOOD PRESSURE: 93 MMHG | RESPIRATION RATE: 20 BRPM | TEMPERATURE: 97.4 F | HEIGHT: 73 IN | HEART RATE: 50 BPM | WEIGHT: 195.7 LBS | SYSTOLIC BLOOD PRESSURE: 135 MMHG | BODY MASS INDEX: 25.94 KG/M2

## 2021-07-12 DIAGNOSIS — M70.61 TROCHANTERIC BURSITIS OF RIGHT HIP: Primary | ICD-10-CM

## 2021-07-12 PROCEDURE — 99213 OFFICE O/P EST LOW 20 MIN: CPT | Performed by: FAMILY MEDICINE

## 2021-07-12 ASSESSMENT — MIFFLIN-ST. JEOR: SCORE: 1826.57

## 2021-10-23 ENCOUNTER — HEALTH MAINTENANCE LETTER (OUTPATIENT)
Age: 46
End: 2021-10-23

## 2021-11-09 ENCOUNTER — LAB REQUISITION (OUTPATIENT)
Dept: LAB | Facility: CLINIC | Age: 46
End: 2021-11-09

## 2021-11-09 ENCOUNTER — APPOINTMENT (OUTPATIENT)
Dept: URGENT CARE | Facility: URGENT CARE | Age: 46
End: 2021-11-09
Payer: COMMERCIAL

## 2021-11-09 PROCEDURE — U0003 INFECTIOUS AGENT DETECTION BY NUCLEIC ACID (DNA OR RNA); SEVERE ACUTE RESPIRATORY SYNDROME CORONAVIRUS 2 (SARS-COV-2) (CORONAVIRUS DISEASE [COVID-19]), AMPLIFIED PROBE TECHNIQUE, MAKING USE OF HIGH THROUGHPUT TECHNOLOGIES AS DESCRIBED BY CMS-2020-01-R: HCPCS | Performed by: INTERNAL MEDICINE

## 2021-11-10 LAB — SARS-COV-2 RNA RESP QL NAA+PROBE: NEGATIVE

## 2022-01-13 ENCOUNTER — LAB REQUISITION (OUTPATIENT)
Dept: LAB | Facility: CLINIC | Age: 47
End: 2022-01-13

## 2022-01-13 LAB — SARS-COV-2 RNA RESP QL NAA+PROBE: NEGATIVE

## 2022-01-13 PROCEDURE — U0005 INFEC AGEN DETEC AMPLI PROBE: HCPCS | Performed by: INTERNAL MEDICINE

## 2022-01-27 ENCOUNTER — LAB REQUISITION (OUTPATIENT)
Dept: LAB | Facility: CLINIC | Age: 47
End: 2022-01-27

## 2022-01-27 LAB — SARS-COV-2 RNA RESP QL NAA+PROBE: NEGATIVE

## 2022-01-27 PROCEDURE — U0003 INFECTIOUS AGENT DETECTION BY NUCLEIC ACID (DNA OR RNA); SEVERE ACUTE RESPIRATORY SYNDROME CORONAVIRUS 2 (SARS-COV-2) (CORONAVIRUS DISEASE [COVID-19]), AMPLIFIED PROBE TECHNIQUE, MAKING USE OF HIGH THROUGHPUT TECHNOLOGIES AS DESCRIBED BY CMS-2020-01-R: HCPCS | Performed by: INTERNAL MEDICINE

## 2022-04-09 ENCOUNTER — HEALTH MAINTENANCE LETTER (OUTPATIENT)
Age: 47
End: 2022-04-09

## 2022-05-26 ENCOUNTER — OFFICE VISIT (OUTPATIENT)
Dept: FAMILY MEDICINE | Facility: CLINIC | Age: 47
End: 2022-05-26
Payer: COMMERCIAL

## 2022-05-26 VITALS
TEMPERATURE: 98.4 F | BODY MASS INDEX: 26.12 KG/M2 | OXYGEN SATURATION: 97 % | WEIGHT: 198 LBS | HEART RATE: 56 BPM | DIASTOLIC BLOOD PRESSURE: 76 MMHG | SYSTOLIC BLOOD PRESSURE: 121 MMHG

## 2022-05-26 DIAGNOSIS — D36.9 TUBULAR ADENOMA: ICD-10-CM

## 2022-05-26 DIAGNOSIS — R03.0 ELEVATED BLOOD PRESSURE READING WITHOUT DIAGNOSIS OF HYPERTENSION: ICD-10-CM

## 2022-05-26 DIAGNOSIS — B35.4 TINEA CORPORIS: Primary | ICD-10-CM

## 2022-05-26 DIAGNOSIS — Z11.59 NEED FOR HEPATITIS C SCREENING TEST: ICD-10-CM

## 2022-05-26 PROCEDURE — 99213 OFFICE O/P EST LOW 20 MIN: CPT | Performed by: FAMILY MEDICINE

## 2022-05-26 RX ORDER — PRENATAL VIT 91/IRON/FOLIC/DHA 28-975-200
COMBINATION PACKAGE (EA) ORAL 2 TIMES DAILY
Qty: 30 G | Refills: 1 | Status: SHIPPED | OUTPATIENT
Start: 2022-05-26

## 2022-05-26 NOTE — PROGRESS NOTES
Office Visit  [facility]  Date of Service: May 26, 2022      Subjective   Jerry Hernandez is a 47 year old male who presents for   Chief Complaint   Patient presents with     Rash     Right armpit has been there for a month     4-5 week rash  Right armpit  Itch cream not helping  Getting bigger  No other rashes. Has not experienced this before.   Otherwise well    Objective   /76 (BP Location: Left arm, Patient Position: Sitting, Cuff Size: Adult Regular)   Pulse 56   Temp 98.4  F (36.9  C)   Wt 89.8 kg (198 lb)   SpO2 97%   BMI 26.12 kg/m   He reports that he has quit smoking. He has never used smokeless tobacco.    Gen: Alert, no apparent distress.  Skin: poorly demarcated, lightly erythematous, macular rash without scaling in the right axilla measuring ~5x8cm    No results found for any visits on 05/26/22.    Assessment & Plan     1. Tinea corporis, right axilla.    Treat with terbinafine 1% cream twice daily for 4-6 weeks, until one week after rash resolves.  2. History of tubular adenoma 2017.     Colonoscopy due in July. Referral made.   3.    History of elevated blood pressure - normal now.      Order Summary                                                      Tinea corporis  -     terbinafine (LAMISIL) 1 % external cream; Apply topically 2 times daily    Need for hepatitis C screening test  -     Hepatitis C Screen Reflex to HCV RNA Quant and Genotype; Future    Elevated blood pressure reading without diagnosis of hypertension    Tubular adenoma  -     Adult Gastro Ref - Procedure Only; Future    Other orders  -     REVIEW OF HEALTH MAINTENANCE PROTOCOL ORDERS            No future appointments.    Completed by: Mariela Guerra M.D., [facility]. 5/26/2022 11:05 AM.  This transcription uses voice recognition software, which may contain typographical errors.  MDM: [email or web address] neighborhood: [facility] [id number], language: English.

## 2022-06-14 ENCOUNTER — E-VISIT (OUTPATIENT)
Dept: URGENT CARE | Facility: CLINIC | Age: 47
End: 2022-06-14
Payer: COMMERCIAL

## 2022-06-14 DIAGNOSIS — R21 RASH AND NONSPECIFIC SKIN ERUPTION: Primary | ICD-10-CM

## 2022-06-14 PROCEDURE — 99207 PR NON-BILLABLE SERV PER CHARTING: CPT | Performed by: FAMILY MEDICINE

## 2022-08-10 ENCOUNTER — TRANSFERRED RECORDS (OUTPATIENT)
Dept: HEALTH INFORMATION MANAGEMENT | Facility: CLINIC | Age: 47
End: 2022-08-10

## 2022-10-09 ENCOUNTER — HEALTH MAINTENANCE LETTER (OUTPATIENT)
Age: 47
End: 2022-10-09

## 2023-05-27 ENCOUNTER — HEALTH MAINTENANCE LETTER (OUTPATIENT)
Age: 48
End: 2023-05-27

## 2023-12-26 ENCOUNTER — LAB REQUISITION (OUTPATIENT)
Dept: LAB | Facility: CLINIC | Age: 48
End: 2023-12-26

## 2023-12-26 PROCEDURE — 87635 SARS-COV-2 COVID-19 AMP PRB: CPT | Performed by: INTERNAL MEDICINE

## 2023-12-27 LAB — SARS-COV-2 RNA RESP QL NAA+PROBE: NEGATIVE

## 2024-01-29 ENCOUNTER — TRANSFERRED RECORDS (OUTPATIENT)
Dept: HEALTH INFORMATION MANAGEMENT | Facility: CLINIC | Age: 49
End: 2024-01-29
Payer: COMMERCIAL

## 2024-02-20 ENCOUNTER — TRANSFERRED RECORDS (OUTPATIENT)
Dept: HEALTH INFORMATION MANAGEMENT | Facility: CLINIC | Age: 49
End: 2024-02-20
Payer: COMMERCIAL

## 2024-04-22 ENCOUNTER — HOSPITAL ENCOUNTER (EMERGENCY)
Facility: CLINIC | Age: 49
Discharge: HOME | End: 2024-04-22
Attending: EMERGENCY MEDICINE | Admitting: EMERGENCY MEDICINE
Payer: COMMERCIAL

## 2024-04-22 ENCOUNTER — APPOINTMENT (OUTPATIENT)
Dept: CT IMAGING | Facility: CLINIC | Age: 49
End: 2024-04-22
Attending: EMERGENCY MEDICINE
Payer: COMMERCIAL

## 2024-04-22 VITALS
RESPIRATION RATE: 18 BRPM | TEMPERATURE: 97.6 F | HEART RATE: 65 BPM | DIASTOLIC BLOOD PRESSURE: 86 MMHG | SYSTOLIC BLOOD PRESSURE: 123 MMHG | WEIGHT: 200 LBS | HEIGHT: 72 IN | OXYGEN SATURATION: 97 % | BODY MASS INDEX: 27.09 KG/M2

## 2024-04-22 DIAGNOSIS — K57.32 DIVERTICULITIS OF COLON: ICD-10-CM

## 2024-04-22 LAB
ALBUMIN SERPL BCG-MCNC: 4.1 G/DL (ref 3.5–5.2)
ALBUMIN UR-MCNC: NEGATIVE MG/DL
ALP SERPL-CCNC: 70 U/L (ref 40–150)
ALT SERPL W P-5'-P-CCNC: 12 U/L (ref 0–70)
ANION GAP SERPL CALCULATED.3IONS-SCNC: 10 MMOL/L (ref 7–15)
APPEARANCE UR: CLEAR
AST SERPL W P-5'-P-CCNC: 20 U/L (ref 0–45)
BASOPHILS # BLD AUTO: 0 10E3/UL (ref 0–0.2)
BASOPHILS NFR BLD AUTO: 1 %
BILIRUB SERPL-MCNC: 0.6 MG/DL
BILIRUB UR QL STRIP: NEGATIVE
BUN SERPL-MCNC: 15.1 MG/DL (ref 6–20)
CALCIUM SERPL-MCNC: 9.5 MG/DL (ref 8.6–10)
CHLORIDE SERPL-SCNC: 105 MMOL/L (ref 98–107)
COLOR UR AUTO: NORMAL
CREAT SERPL-MCNC: 0.84 MG/DL (ref 0.67–1.17)
DEPRECATED HCO3 PLAS-SCNC: 23 MMOL/L (ref 22–29)
EGFRCR SERPLBLD CKD-EPI 2021: >90 ML/MIN/1.73M2
EOSINOPHIL # BLD AUTO: 0.2 10E3/UL (ref 0–0.7)
EOSINOPHIL NFR BLD AUTO: 3 %
ERYTHROCYTE [DISTWIDTH] IN BLOOD BY AUTOMATED COUNT: 12.2 % (ref 10–15)
GLUCOSE SERPL-MCNC: 92 MG/DL (ref 70–99)
GLUCOSE UR STRIP-MCNC: NEGATIVE MG/DL
HCT VFR BLD AUTO: 33 % (ref 40–53)
HGB BLD-MCNC: 11.3 G/DL (ref 13.3–17.7)
HGB UR QL STRIP: NEGATIVE
IMM GRANULOCYTES # BLD: 0 10E3/UL
IMM GRANULOCYTES NFR BLD: 1 %
KETONES UR STRIP-MCNC: NEGATIVE MG/DL
LEUKOCYTE ESTERASE UR QL STRIP: NEGATIVE
LIPASE SERPL-CCNC: 19 U/L (ref 13–60)
LYMPHOCYTES # BLD AUTO: 1.3 10E3/UL (ref 0.8–5.3)
LYMPHOCYTES NFR BLD AUTO: 19 %
MCH RBC QN AUTO: 31 PG (ref 26.5–33)
MCHC RBC AUTO-ENTMCNC: 34.2 G/DL (ref 31.5–36.5)
MCV RBC AUTO: 91 FL (ref 78–100)
MONOCYTES # BLD AUTO: 0.7 10E3/UL (ref 0–1.3)
MONOCYTES NFR BLD AUTO: 10 %
NEUTROPHILS # BLD AUTO: 4.4 10E3/UL (ref 1.6–8.3)
NEUTROPHILS NFR BLD AUTO: 66 %
NITRATE UR QL: NEGATIVE
NRBC # BLD AUTO: 0 10E3/UL
NRBC BLD AUTO-RTO: 0 /100
PH UR STRIP: 6.5 [PH] (ref 5–7)
PLATELET # BLD AUTO: 156 10E3/UL (ref 150–450)
POTASSIUM SERPL-SCNC: 4.4 MMOL/L (ref 3.4–5.3)
PROT SERPL-MCNC: 7.1 G/DL (ref 6.4–8.3)
RBC # BLD AUTO: 3.64 10E6/UL (ref 4.4–5.9)
RBC URINE: <1 /HPF
SODIUM SERPL-SCNC: 138 MMOL/L (ref 135–145)
SP GR UR STRIP: 1.02 (ref 1–1.03)
UROBILINOGEN UR STRIP-MCNC: NORMAL MG/DL
WBC # BLD AUTO: 6.6 10E3/UL (ref 4–11)
WBC URINE: 0 /HPF

## 2024-04-22 PROCEDURE — 250N000011 HC RX IP 250 OP 636: Performed by: EMERGENCY MEDICINE

## 2024-04-22 PROCEDURE — 74177 CT ABD & PELVIS W/CONTRAST: CPT

## 2024-04-22 PROCEDURE — 96361 HYDRATE IV INFUSION ADD-ON: CPT

## 2024-04-22 PROCEDURE — 258N000003 HC RX IP 258 OP 636: Performed by: EMERGENCY MEDICINE

## 2024-04-22 PROCEDURE — 81001 URINALYSIS AUTO W/SCOPE: CPT | Performed by: EMERGENCY MEDICINE

## 2024-04-22 PROCEDURE — 80053 COMPREHEN METABOLIC PANEL: CPT | Performed by: EMERGENCY MEDICINE

## 2024-04-22 PROCEDURE — 83690 ASSAY OF LIPASE: CPT | Performed by: EMERGENCY MEDICINE

## 2024-04-22 PROCEDURE — 99285 EMERGENCY DEPT VISIT HI MDM: CPT | Mod: 25

## 2024-04-22 PROCEDURE — 36415 COLL VENOUS BLD VENIPUNCTURE: CPT | Performed by: EMERGENCY MEDICINE

## 2024-04-22 PROCEDURE — 85025 COMPLETE CBC W/AUTO DIFF WBC: CPT | Performed by: EMERGENCY MEDICINE

## 2024-04-22 PROCEDURE — 96374 THER/PROPH/DIAG INJ IV PUSH: CPT | Mod: 59

## 2024-04-22 RX ORDER — ONDANSETRON 2 MG/ML
4 INJECTION INTRAMUSCULAR; INTRAVENOUS EVERY 30 MIN PRN
Status: DISCONTINUED | OUTPATIENT
Start: 2024-04-22 | End: 2024-04-22 | Stop reason: HOSPADM

## 2024-04-22 RX ORDER — MORPHINE SULFATE 4 MG/ML
4 INJECTION, SOLUTION INTRAMUSCULAR; INTRAVENOUS
Status: COMPLETED | OUTPATIENT
Start: 2024-04-22 | End: 2024-04-22

## 2024-04-22 RX ORDER — HYDROCODONE BITARTRATE AND ACETAMINOPHEN 5; 325 MG/1; MG/1
1 TABLET ORAL EVERY 6 HOURS PRN
Qty: 12 TABLET | Refills: 0 | Status: SHIPPED | OUTPATIENT
Start: 2024-04-22 | End: 2024-04-25

## 2024-04-22 RX ORDER — IOPAMIDOL 755 MG/ML
101 INJECTION, SOLUTION INTRAVASCULAR ONCE
Status: COMPLETED | OUTPATIENT
Start: 2024-04-22 | End: 2024-04-22

## 2024-04-22 RX ADMIN — IOPAMIDOL 101 ML: 755 INJECTION, SOLUTION INTRAVENOUS at 11:16

## 2024-04-22 RX ADMIN — MORPHINE SULFATE 4 MG: 4 INJECTION, SOLUTION INTRAMUSCULAR; INTRAVENOUS at 09:56

## 2024-04-22 RX ADMIN — SODIUM CHLORIDE 1000 ML: 9 INJECTION, SOLUTION INTRAVENOUS at 09:51

## 2024-04-22 ASSESSMENT — ACTIVITIES OF DAILY LIVING (ADL)
ADLS_ACUITY_SCORE: 35

## 2024-04-22 ASSESSMENT — COLUMBIA-SUICIDE SEVERITY RATING SCALE - C-SSRS
1. IN THE PAST MONTH, HAVE YOU WISHED YOU WERE DEAD OR WISHED YOU COULD GO TO SLEEP AND NOT WAKE UP?: NO
6. HAVE YOU EVER DONE ANYTHING, STARTED TO DO ANYTHING, OR PREPARED TO DO ANYTHING TO END YOUR LIFE?: NO
2. HAVE YOU ACTUALLY HAD ANY THOUGHTS OF KILLING YOURSELF IN THE PAST MONTH?: NO

## 2024-04-22 NOTE — ED PROVIDER NOTES
"  History     Chief Complaint:  Abdominal Pain       HPI   Jerry Hernandez is a 48 year old male with history of GERD who presents to the ED with his wife for evaluation of abdominal pain. He reports having centralized abdominal pain for the past 6 days after eating a [employer] Whopper and initially thought it was gas pains. He states the pain has been progressively worsening. Patient took ibuprofen with no relief. He also reports having a loose bowel movement, diarrhea and diarrhea. The pain was 7 or 8/10. Denies fever or vomiting. Patient doesn't have his appendix but states the pain then was \"way less\" than the current pain he has. No one is sick at home. No recent travel.     Independent Historian:   None - Patient Only    Review of External Notes:   Office visit from 5/26/2022    Medications:    The patient is not currently taking any prescribed medications.    Past Medical History:    GERD  Perineal abscess  Tobacco use disorder   Tubular adenoma     Past Surgical History:    Appendectomy  Esophagoscopy  Incision and drainage rectum, combined  Incision and drainage rectum + Irrigation and debridement rectum  Exam under anesthesia anus  Exam under anesthesia, fistulotomy rectum, combined  Fistulotomy rectum  Vasectomy      Physical Exam   Patient Vitals for the past 24 hrs:   BP Temp Temp src Pulse Resp SpO2 Height Weight   04/22/24 1015 -- -- -- -- 18 -- -- --   04/22/24 0956 -- -- -- -- 18 -- -- --   04/22/24 0928 123/86 97.6  F (36.4  C) Temporal 65 18 97 % 1.829 m (6') 90.7 kg (200 lb)      Physical Exam  General: Patient is alert and normal appearing.  HEENT: Head atraumatic    Eyes: pupils equal and reactive. Conjunctiva clear   Nares: patent   Oropharynx: no lesions, uvula midline, no palatal draping, normal voice, no trismus  Neck: Supple without lymphadenopathy, no meningismus  Chest: Heart regular rate and rhythm.   Lungs: Equal clear to auscultation with no wheeze or rales  Abdomen: Soft, left " lower quadrant tenderness to palpation with no rebound or guarding, nondistended, normal bowel sounds  Back: No costovertebral angle tenderness, no midline C, T or L spine tenderness  Neuro: Grossly nonfocal, normal speech, strength equal bilaterally, CN 2-12 intact  Extremities: No deformities, equal radial and DP pulses. No clubbing, cyanosis.  No edema  Skin: Warm and dry with no rash.       Emergency Department Course   Imaging:  CT Abdomen Pelvis w Contrast   Final Result   IMPRESSION:    1.  Positive for acute uncomplicated sigmoid diverticulitis.   2.  Hepatic steatosis.      MIGUEL RONQUILLO MD            SYSTEM ID:  [id number]         Report per radiology    Laboratory:  Labs Ordered and Resulted from Time of ED Arrival to Time of ED Departure   CBC WITH PLATELETS AND DIFFERENTIAL - Abnormal       Result Value    WBC Count 6.6      RBC Count 3.64 (*)     Hemoglobin 11.3 (*)     Hematocrit 33.0 (*)     MCV 91      MCH 31.0      MCHC 34.2      RDW 12.2      Platelet Count 156      % Neutrophils 66      % Lymphocytes 19      % Monocytes 10      % Eosinophils 3      % Basophils 1      % Immature Granulocytes 1      NRBCs per 100 WBC 0      Absolute Neutrophils 4.4      Absolute Lymphocytes 1.3      Absolute Monocytes 0.7      Absolute Eosinophils 0.2      Absolute Basophils 0.0      Absolute Immature Granulocytes 0.0      Absolute NRBCs 0.0     COMPREHENSIVE METABOLIC PANEL - Normal    Sodium 138      Potassium 4.4      Carbon Dioxide (CO2) 23      Anion Gap 10      Urea Nitrogen 15.1      Creatinine 0.84      GFR Estimate >90      Calcium 9.5      Chloride 105      Glucose 92      Alkaline Phosphatase 70      AST 20      ALT 12      Protein Total 7.1      Albumin 4.1      Bilirubin Total 0.6     LIPASE - Normal    Lipase 19     ROUTINE UA WITH MICROSCOPIC REFLEX TO CULTURE - Normal    Color Urine Light Yellow      Appearance Urine Clear      Glucose Urine Negative      Bilirubin Urine Negative      Ketones Urine  Negative      Specific Gravity Urine 1.022      Blood Urine Negative      pH Urine 6.5      Protein Albumin Urine Negative      Urobilinogen Urine Normal      Nitrite Urine Negative      Leukocyte Esterase Urine Negative      RBC Urine <1      WBC Urine 0        Procedures   None    Emergency Department Course & Assessments:    Interventions:  Medications   ondansetron (ZOFRAN) injection 4 mg (has no administration in time range)   sodium chloride 0.9% BOLUS 1,000 mL (1,000 mLs Intravenous $New Bag 4/22/24 0951)   morphine (PF) injection 4 mg (4 mg Intravenous $Given 4/22/24 0956)   iopamidol (ISOVUE-370) solution 101 mL (101 mLs Intravenous $Given 4/22/24 1116)   sodium chloride (PF) 0.9% PF flush 69 mL (69 mLs Intravenous $Given 4/22/24 1116)      Independent Interpretation (X-rays, CTs, rhythm strip):  None    Assessments/Consultations/Discussion of Management or Tests:  ED Course as of 04/22/24 1221   Mon Apr 22, 2024   0942 I obtained history and examined the patient as noted above.    1212 I rechecked the patient and explained findings.    1220 I prepared the patient to be discharged home.      Social Determinants of Health affecting care:   None    Disposition:  The patient was discharged.     Impression & Plan    CMS Diagnoses: None    MIPS (If applicable):  N/A    Medical Decision Making:  Jerry Hernandez is a 48 year old male who presents for evaluation of abdominal pain.  A broad differential diagnosis was considered and CT confirms diverticulitis without abscess or perforation; this appears consistent with the history and physical exam so I doubt another underlying etiology is also present.  The patient's pain has been controlled by interventions in the emergency department.  This represents uncomplicated diverticulitis at this time.  There are no signs of sepsis, shock or bacteremia.  The natural history of this problem was discussed, and I educated the patient regarding the symptoms and signs that  should prompt return to the Emergency Department.  This would include worsening fevers, chills, vomiting, and more intense pain.  The patient is to take antibiotics and pain medications as directed.    Follow-up with primary care physician is indicated in 1-2 days.       Diagnosis:    ICD-10-CM    1. Diverticulitis of colon  K57.32          Discharge Medications:  New Prescriptions    AMOXICILLIN-CLAVULANATE (AUGMENTIN) 875-125 MG TABLET    Take 1 tablet by mouth 2 times daily for 7 days    HYDROCODONE-ACETAMINOPHEN (NORCO) 5-325 MG TABLET    Take 1 tablet by mouth every 6 hours as needed for pain      Scribe Disclosure:  Stephania GUTIERREZ, am serving as a scribe at 9:49 AM on 4/22/2024 to document services personally performed by Mariza Mallory MD based on my observations and the provider's statements to me.   4/22/2024   Mariza Mallory MD Neuner, Maria Bea, MD  04/22/24 9071

## 2024-04-22 NOTE — ED TRIAGE NOTES
Pt has had abdominal pain x 1 week; centrally located.  States that he has not had any urinary issues and has noted some diarrhea.

## 2024-08-03 ENCOUNTER — HEALTH MAINTENANCE LETTER (OUTPATIENT)
Age: 49
End: 2024-08-03

## 2024-08-05 ENCOUNTER — MYC MEDICAL ADVICE (OUTPATIENT)
Dept: FAMILY MEDICINE | Facility: CLINIC | Age: 49
End: 2024-08-05
Payer: COMMERCIAL

## 2024-08-05 DIAGNOSIS — Z87.19 H/O DIVERTICULITIS OF COLON: ICD-10-CM

## 2024-08-05 DIAGNOSIS — Z86.0100 HISTORY OF COLONIC POLYPS: Primary | ICD-10-CM

## 2024-08-07 ENCOUNTER — TELEPHONE (OUTPATIENT)
Dept: GASTROENTEROLOGY | Facility: CLINIC | Age: 49
End: 2024-08-07
Payer: COMMERCIAL

## 2024-08-07 NOTE — TELEPHONE ENCOUNTER
"Endoscopy Scheduling Screen    Have you had a positive Covid test in the last 14 days?  No    What is your communication preference for Instructions and/or Bowel Prep?   MyChart    What insurance is in the chart?  Other:  [facility]     Ordering/Referring Provider:     WEGENER, JOEL DANIEL IRWIN       (If ordering provider performs procedure, schedule with ordering provider unless otherwise instructed. )    BMI: Estimated body mass index is 27.12 kg/m  as calculated from the following:    Height as of 4/22/24: 1.829 m (6').    Weight as of 4/22/24: 90.7 kg (200 lb).     Sedation Ordered  moderate sedation.   If patient BMI > 50 do not schedule in [facility].    If patient BMI > 45 do not schedule at [facility].    Are you taking methadone or Suboxone?  No    Have you had difficulties, pain, or discomfort during past endoscopy procedures?  No    Are you taking any prescription medications for pain 3 or more times per week?   NO, No RN review required.    Do you have a history of malignant hyperthermia?  No    (Females) Are you currently pregnant?   No     Have you been diagnosed or told you have pulmonary hypertension?   No    Do you have an LVAD?  No    Have you been told you have moderate to severe sleep apnea?  No    Have you been told you have COPD, asthma, or any other lung disease?  No    Do you have any heart conditions?  No     Have you ever had or are you waiting for an organ transplant?  No. Continue scheduling, no site restrictions.    Have you had a stroke or transient ischemic attack (TIA aka \"mini stroke\" in the last 6 months?   No    Have you been diagnosed with or been told you have cirrhosis of the liver?   No    Are you currently on dialysis?   No    Do you need assistance transferring?   No    BMI: Estimated body mass index is 27.12 kg/m  as calculated from the following:    Height as of 4/22/24: 1.829 m (6').    Weight as of 4/22/24: 90.7 kg (200 lb).     Is patients BMI > 40 and scheduling location [facility]?  No    Do " you take an injectable medication for weight loss or diabetes (excluding insulin)?  No    Do you take the medication Naltrexone?  No    Do you take blood thinners?  No       Prep   Are you currently on dialysis or do you have chronic kidney disease?  No    Do you have a diagnosis of diabetes?  No    Do you have a diagnosis of cystic fibrosis (CF)?  No    On a regular basis do you go 3 -5 days between bowel movements?  No    BMI > 40?  No    Preferred Pharmacy:    [facility] [place], [place] - [address] [[id number]]       Final Scheduling Details     Procedure scheduled  Colonoscopy    Surgeon:  Dayna      Date of procedure:  09/16/2024     Pre-OP / PAC:   No - Not required for this site.    Location  [facility] - [facility] - Per order.    Sedation   Moderate Sedation - Per order.      Patient Reminders:   You will receive a call from a Nurse to review instructions and health history.  This assessment must be completed prior to your procedure.  Failure to complete the Nurse assessment may result in the procedure being cancelled.      On the day of your procedure, please designate an adult(s) who can drive you home stay with you for the next 24 hours. The medicines used in the exam will make you sleepy. You will not be able to drive.      You cannot take public transportation, ride share services, or non-medical taxi service without a responsible caregiver.  Medical transport services are allowed with the requirement that a responsible caregiver will receive you at your destination.  We require that drivers and caregivers are confirmed prior to your procedure.

## 2024-09-05 NOTE — TELEPHONE ENCOUNTER
Patient called requesting an order for Colonoscopy since he has family history of cancer    Ok to leave message   No

## 2024-09-09 ENCOUNTER — TELEPHONE (OUTPATIENT)
Dept: GASTROENTEROLOGY | Facility: CLINIC | Age: 49
End: 2024-09-09
Payer: COMMERCIAL

## 2024-09-10 NOTE — TELEPHONE ENCOUNTER
Attempted to contact patient in order to complete pre assessment questions.     Patient scheduled for Colonoscopy on 9/16/24.     No answer. Left message to return call to [phone] option 2.    Callback required communication sent via [employer].    Kristy Ruiz RN  Endoscopy Procedure Pre Assessment

## 2024-09-10 NOTE — TELEPHONE ENCOUNTER
Pre assessment completed for upcoming procedure.   (Please see previous telephone encounter notes for complete details)    Patient  returned call.       Procedure details:    Arrival time and facility location reviewed.    Pre op exam needed? No.    Designated  policy reviewed. Instructed to have someone stay 6  hours post procedure.       Medication review:    Medications reviewed. Please see supporting documentation below. Holding recommendations discussed (if applicable).   N/A      Prep for procedure:     Procedure prep instructions reviewed.        Any additional information needed:  N/A      Patient  verbalized understanding and had no questions or concerns at this time.      Ariana Sagastume RN  Endoscopy Procedure Pre Assessment   [phone] option 2

## 2024-09-10 NOTE — TELEPHONE ENCOUNTER
Pre visit planning completed.      Procedure details:    Patient scheduled for Colonoscopy on 9/16/24.     Arrival time: 1200. Procedure time 1300    Facility location: [facility]; [address] [place] [address], 5[address] Floor, [place] [address]. Check in location: 5th Floor.    Sedation type: Conscious sedation     Pre op exam needed? No.    Indication for procedure: history of polyps, history of diverticulitis      Chart review:     Electronic implanted devices? No    Recent diagnosis of diverticulitis within the last 6 weeks? No, CT positive for acute sigmoid diverticulitis 4/22/24      Medication review:    Diabetic? No    Anticoagulants? No    Weight loss medication/injectable? No GLP-1 medication per patient's medication list.  RN will verify with pre-assessment call.    NSAIDS? No NSAID medications per patient's medication list.  RN will verify with pre-assessment call.    Other medication HOLDING recommendations:  N/A      Prep for procedure:     Bowel prep recommendation: Standard Miralax  Due to: standard bowel prep.    Prep instructions sent via [employer], sent by CRC LI Ruiz RN  Endoscopy Procedure Pre Assessment RN  [phone] option 4

## 2024-09-12 NOTE — PROGRESS NOTES
Pre-procedure note:     48 yo M with a PMH of perianal abscess/fistula (s/p resection in 2019) and colon polyps and family history of CRC (paternal aunt in her 60s) and colon polyp (father in his 60s), who was referred for elective colonoscopy for polyp surveillance. Last colonoscopy in 7/2017 revealed small TA.     Ref: Wegener, Joel Daniel Irwin, MD

## 2024-09-16 ENCOUNTER — HOSPITAL ENCOUNTER (OUTPATIENT)
Facility: AMBULATORY SURGERY CENTER | Age: 49
Discharge: HOME | End: 2024-09-16
Attending: INTERNAL MEDICINE | Admitting: INTERNAL MEDICINE
Payer: COMMERCIAL

## 2024-09-16 VITALS
HEIGHT: 73 IN | WEIGHT: 190 LBS | OXYGEN SATURATION: 97 % | DIASTOLIC BLOOD PRESSURE: 72 MMHG | BODY MASS INDEX: 25.18 KG/M2 | SYSTOLIC BLOOD PRESSURE: 106 MMHG | TEMPERATURE: 97 F | RESPIRATION RATE: 16 BRPM | HEART RATE: 57 BPM

## 2024-09-16 LAB — COLONOSCOPY: NORMAL

## 2024-09-16 PROCEDURE — 45380 COLONOSCOPY AND BIOPSY: CPT | Mod: 33 | Performed by: INTERNAL MEDICINE

## 2024-09-16 PROCEDURE — 88305 TISSUE EXAM BY PATHOLOGIST: CPT | Mod: 26 | Performed by: PATHOLOGY

## 2024-09-16 PROCEDURE — 88305 TISSUE EXAM BY PATHOLOGIST: CPT | Mod: TC | Performed by: INTERNAL MEDICINE

## 2024-09-16 RX ORDER — ONDANSETRON 2 MG/ML
4 INJECTION INTRAMUSCULAR; INTRAVENOUS
Status: DISCONTINUED | OUTPATIENT
Start: 2024-09-16 | End: 2024-09-17 | Stop reason: HOSPADM

## 2024-09-16 RX ORDER — SODIUM CHLORIDE, SODIUM LACTATE, POTASSIUM CHLORIDE, CALCIUM CHLORIDE 600; 310; 30; 20 MG/100ML; MG/100ML; MG/100ML; MG/100ML
INJECTION, SOLUTION INTRAVENOUS CONTINUOUS
Status: DISCONTINUED | OUTPATIENT
Start: 2024-09-16 | End: 2024-09-17 | Stop reason: HOSPADM

## 2024-09-16 RX ORDER — PROCHLORPERAZINE MALEATE 10 MG
10 TABLET ORAL EVERY 6 HOURS PRN
Status: CANCELLED | OUTPATIENT
Start: 2024-09-16

## 2024-09-16 RX ORDER — OXYCODONE HYDROCHLORIDE 5 MG/1
5 TABLET ORAL
Status: DISCONTINUED | OUTPATIENT
Start: 2024-09-16 | End: 2024-09-17 | Stop reason: HOSPADM

## 2024-09-16 RX ORDER — ONDANSETRON 4 MG/1
4 TABLET, ORALLY DISINTEGRATING ORAL EVERY 30 MIN PRN
Status: DISCONTINUED | OUTPATIENT
Start: 2024-09-16 | End: 2024-09-17 | Stop reason: HOSPADM

## 2024-09-16 RX ORDER — ONDANSETRON 2 MG/ML
4 INJECTION INTRAMUSCULAR; INTRAVENOUS EVERY 30 MIN PRN
Status: DISCONTINUED | OUTPATIENT
Start: 2024-09-16 | End: 2024-09-17 | Stop reason: HOSPADM

## 2024-09-16 RX ORDER — OXYCODONE HYDROCHLORIDE 5 MG/1
10 TABLET ORAL
Status: DISCONTINUED | OUTPATIENT
Start: 2024-09-16 | End: 2024-09-17 | Stop reason: HOSPADM

## 2024-09-16 RX ORDER — ONDANSETRON 4 MG/1
4 TABLET, ORALLY DISINTEGRATING ORAL EVERY 6 HOURS PRN
Status: CANCELLED | OUTPATIENT
Start: 2024-09-16

## 2024-09-16 RX ORDER — LIDOCAINE 40 MG/G
CREAM TOPICAL
Status: DISCONTINUED | OUTPATIENT
Start: 2024-09-16 | End: 2024-09-17 | Stop reason: HOSPADM

## 2024-09-16 RX ORDER — ONDANSETRON 2 MG/ML
4 INJECTION INTRAMUSCULAR; INTRAVENOUS EVERY 6 HOURS PRN
Status: CANCELLED | OUTPATIENT
Start: 2024-09-16

## 2024-09-16 RX ORDER — HYDROMORPHONE HYDROCHLORIDE 1 MG/ML
0.2 INJECTION, SOLUTION INTRAMUSCULAR; INTRAVENOUS; SUBCUTANEOUS EVERY 5 MIN PRN
Status: DISCONTINUED | OUTPATIENT
Start: 2024-09-16 | End: 2024-09-17 | Stop reason: HOSPADM

## 2024-09-16 RX ORDER — FENTANYL CITRATE 50 UG/ML
INJECTION, SOLUTION INTRAMUSCULAR; INTRAVENOUS DAILY PRN
Status: DISCONTINUED | OUTPATIENT
Start: 2024-09-16 | End: 2024-09-16 | Stop reason: HOSPADM

## 2024-09-16 RX ORDER — HYDROMORPHONE HYDROCHLORIDE 1 MG/ML
0.4 INJECTION, SOLUTION INTRAMUSCULAR; INTRAVENOUS; SUBCUTANEOUS EVERY 5 MIN PRN
Status: DISCONTINUED | OUTPATIENT
Start: 2024-09-16 | End: 2024-09-17 | Stop reason: HOSPADM

## 2024-09-16 RX ORDER — FLUMAZENIL 0.1 MG/ML
0.2 INJECTION, SOLUTION INTRAVENOUS
Status: CANCELLED | OUTPATIENT
Start: 2024-09-16 | End: 2024-09-17

## 2024-09-16 RX ORDER — NALOXONE HYDROCHLORIDE 0.4 MG/ML
0.1 INJECTION, SOLUTION INTRAMUSCULAR; INTRAVENOUS; SUBCUTANEOUS
Status: DISCONTINUED | OUTPATIENT
Start: 2024-09-16 | End: 2024-09-17 | Stop reason: HOSPADM

## 2024-09-16 RX ORDER — FENTANYL CITRATE 50 UG/ML
50 INJECTION, SOLUTION INTRAMUSCULAR; INTRAVENOUS EVERY 5 MIN PRN
Status: DISCONTINUED | OUTPATIENT
Start: 2024-09-16 | End: 2024-09-17 | Stop reason: HOSPADM

## 2024-09-16 RX ORDER — DEXAMETHASONE SODIUM PHOSPHATE 10 MG/ML
4 INJECTION, SOLUTION INTRAMUSCULAR; INTRAVENOUS
Status: DISCONTINUED | OUTPATIENT
Start: 2024-09-16 | End: 2024-09-17 | Stop reason: HOSPADM

## 2024-09-16 RX ORDER — LABETALOL HYDROCHLORIDE 5 MG/ML
10 INJECTION, SOLUTION INTRAVENOUS
Status: DISCONTINUED | OUTPATIENT
Start: 2024-09-16 | End: 2024-09-17 | Stop reason: HOSPADM

## 2024-09-16 RX ORDER — ACETAMINOPHEN 325 MG/1
975 TABLET ORAL ONCE
Status: DISCONTINUED | OUTPATIENT
Start: 2024-09-16 | End: 2024-09-17 | Stop reason: HOSPADM

## 2024-09-16 RX ORDER — FENTANYL CITRATE 50 UG/ML
25 INJECTION, SOLUTION INTRAMUSCULAR; INTRAVENOUS EVERY 5 MIN PRN
Status: DISCONTINUED | OUTPATIENT
Start: 2024-09-16 | End: 2024-09-17 | Stop reason: HOSPADM

## 2024-09-16 NOTE — LETTER
"September 18, 2024      Osmany Hernandez  [address] [facility] [address]        Dear Carolyn VICTOR M Hernandez,    The polyp that was removed during your colonoscopy returned as benign. The polyp was what we call adenomas, meaning that if left there, it had the potential to turn into a cancer in 10-20 years but it was removed so there is nothing to worry about. I recommend you have a repeat colonoscopy in 7-10 years to look for any new polyps.    Resulted Orders   Surgical Pathology Exam   Result Value Ref Range    Case Report       Surgical Pathology Report                         Case: [id number]                                  Authorizing Provider:  Alonzo Latif MD      Collected:           09/16/2024 01:08 PM          Ordering Location:     [facility] OR  Received:            09/16/2024 02:16 PM                                 [place]                                                                  Pathologist:           Kaushik France DO                                                            Specimen:    Large Intestine, Colon, Ascending, Ascending Colon Polyp                                   Final Diagnosis       A.  COLON, ASCENDING, POLYP:  - Tubular adenoma  - No high-grade dysplasia or malignancy identified        Clinical Information       History of colonic polyps      Gross Description       A(1). Large Intestine, Colon, Ascending, Ascending Colon Polyp:  The specimen is received in formalin with proper patient identification, labeled \"ascending colon polyp\".  The specimen consists of a single 0.7 cm in greatest dimension, gray-tan, sessile fragment of mucosal tissue.  The specimen is submitted in toto as A1.       Microscopic Description       Microscopic examination performed.        Performing Labs       The technical component of this testing was completed at [facility].    Stain " controls for all stains resulted within this report have been reviewed and show appropriate reactivity.       Case Images         If you have any questions or concerns, please call the clinic at the number listed above.       Sincerely,      Alonzo Latif MD

## 2024-09-16 NOTE — H&P
Gastroenterology Pre-op History and Physical    Jerry Hernandez MRN# [id number]   Age: 49 year old YOB: 1975      Date of Surgery: 09/16/24  [facility]      Date of Exam 9/16/2024 Facility Same Day       Primary care provider: Wegener, Joel Daniel Irwin         Chief Complaint and/or Reason for Procedure:     50 yo M with a PMH of perianal abscess/fistula (s/p resection in 2019) and colon polyps and family history of CRC (paternal aunt in her 60s) and colon polyp (father in his 60s), who was referred for elective colonoscopy for polyp surveillance. Last colonoscopy in 7/2017 revealed small TA.          Past Medical and Surgical History:     Past Medical History:   Diagnosis Date    CARDIOVASCULAR SCREENING; LDL GOAL LESS THAN 160 5/9/2010    GERD (gastroesophageal reflux disease) 2005    EGD-2005    Perineal abscess 1/30/2015    Tobacco use disorder 3/21/2013    Using Electronic Cigarettes. Has not smoked in 2 weeks    Tubular adenoma 7/18/2017     Past Surgical History:   Procedure Laterality Date    EXAM UNDER ANESTHESIA ANUS N/A 7/8/2015    Procedure: EXAM UNDER ANESTHESIA ANUS;  Surgeon: James Krause MD;  Location: [facility] OR    EXAM UNDER ANESTHESIA RECTUM N/A 1/30/2015    Procedure: EXAM UNDER ANESTHESIA RECTUM;  Surgeon: James Krause MD;  [facility]: [facility]    EXAM UNDER ANESTHESIA, FISTULOTOMY RECTUM, COMBINED N/A 4/6/2016    Procedure: COMBINED EXAM UNDER ANESTHESIA, FISTULOTOMY RECTUM;  Surgeon: James Krause MD;  Location: [facility] OR    FISTULOTOMY RECTUM N/A 7/8/2016    Procedure: FISTULOTOMY RECTUM;  Surgeon: James Krause MD;  [facility]    HC ESOPHAGOSCOPY, DIAGNOSTIC  2005    Normal    INCISION AND DRAINAGE RECTUM, COMBINED N/A 1/28/2015    Procedure: COMBINED INCISION AND DRAINAGE RECTUM;  Surgeon: Natasha Roldan MD;  Location: [facility] OR    IRRIGATION AND DEBRIDEMENT RECTUM, COMBINED N/A 1/30/2015    Procedure: COMBINED IRRIGATION  "AND DEBRIDEMENT RECTUM;  Surgeon: James Krause MD;  Location: [facility] OR    PLACEMENT OF SETON RECTUM N/A 1/30/2015    Procedure: PLACEMENT OF SETON RECTUM;  Surgeon: James Krause MD;  Location: [facility] OR    VASECTOMY  12/2016    [facility] APPENDECTOMY  2005            Medications (include herbals and vitamins):        (Not in a hospital admission)            Allergies:      Allergies   Allergen Reactions    Shellfish Allergy Difficulty breathing     Hives, visual disturbance               Physical Exam:   All vitals have been reviewed  Patient Vitals for the past 8 hrs:   BP Temp Temp src Pulse Resp SpO2 Height Weight   09/16/24 1156 123/88 97  F (36.1  C) Temporal 61 18 99 % 1.854 m (6' 1\") 86.2 kg (190 lb)     No intake/output data recorded.    General: Lying in bed, in NAD  ENT: Normocephalic, atraumatic   Lungs: Normal work of breathing   Cardiovascular: Normal rate, regular rhythm   Abdomen: Soft, non-tender             Anesthetic risk and/or ASA classification:   ASA: 2    Alonzo Latif MD       "

## 2024-09-17 LAB
PATH REPORT.COMMENTS IMP SPEC: NORMAL
PATH REPORT.COMMENTS IMP SPEC: NORMAL
PATH REPORT.FINAL DX SPEC: NORMAL
PATH REPORT.GROSS SPEC: NORMAL
PATH REPORT.MICROSCOPIC SPEC OTHER STN: NORMAL
PATH REPORT.RELEVANT HX SPEC: NORMAL
PHOTO IMAGE: NORMAL

## 2025-01-09 ENCOUNTER — OFFICE VISIT (OUTPATIENT)
Dept: FAMILY MEDICINE | Facility: CLINIC | Age: 50
End: 2025-01-09
Payer: COMMERCIAL

## 2025-01-09 VITALS
WEIGHT: 193 LBS | SYSTOLIC BLOOD PRESSURE: 121 MMHG | TEMPERATURE: 97.3 F | RESPIRATION RATE: 16 BRPM | HEIGHT: 73 IN | OXYGEN SATURATION: 97 % | DIASTOLIC BLOOD PRESSURE: 83 MMHG | BODY MASS INDEX: 25.58 KG/M2 | HEART RATE: 61 BPM

## 2025-01-09 DIAGNOSIS — Z13.6 CARDIOVASCULAR SCREENING; LDL GOAL LESS THAN 160: ICD-10-CM

## 2025-01-09 DIAGNOSIS — Z13.1 SCREENING FOR DIABETES MELLITUS: ICD-10-CM

## 2025-01-09 DIAGNOSIS — Z11.59 NEED FOR HEPATITIS C SCREENING TEST: ICD-10-CM

## 2025-01-09 DIAGNOSIS — Z87.19 H/O DIVERTICULITIS OF COLON: ICD-10-CM

## 2025-01-09 DIAGNOSIS — Z00.00 ROUTINE GENERAL MEDICAL EXAMINATION AT A HEALTH CARE FACILITY: Primary | ICD-10-CM

## 2025-01-09 LAB
EST. AVERAGE GLUCOSE BLD GHB EST-MCNC: 105 MG/DL
HBA1C MFR BLD: 5.3 % (ref 0–5.6)

## 2025-01-09 SDOH — HEALTH STABILITY: PHYSICAL HEALTH: ON AVERAGE, HOW MANY DAYS PER WEEK DO YOU ENGAGE IN MODERATE TO STRENUOUS EXERCISE (LIKE A BRISK WALK)?: 3 DAYS

## 2025-01-09 ASSESSMENT — PAIN SCALES - GENERAL: PAINLEVEL_OUTOF10: NO PAIN (0)

## 2025-01-09 ASSESSMENT — SOCIAL DETERMINANTS OF HEALTH (SDOH): HOW OFTEN DO YOU GET TOGETHER WITH FRIENDS OR RELATIVES?: ONCE A WEEK

## 2025-01-09 NOTE — PATIENT INSTRUCTIONS
Can do e-vsit if having early diverticulitis flair.   Patient Education   Preventive Care Advice   This is general advice given by our system to help you stay healthy. However, your care team may have specific advice just for you. Please talk to your care team about your preventive care needs.  Nutrition  Eat 5 or more servings of fruits and vegetables each day.  Try wheat bread, brown rice and whole grain pasta (instead of white bread, rice, and pasta).  Get enough calcium and vitamin D. Check the label on foods and aim for 100% of the RDA (recommended daily allowance).  Lifestyle  Exercise at least 150 minutes each week  (30 minutes a day, 5 days a week).  Do muscle strengthening activities 2 days a week. These help control your weight and prevent disease.  No smoking.  Wear sunscreen to prevent skin cancer.  Have a dental exam and cleaning every 6 months.  Yearly exams  See your health care team every year to talk about:  Any changes in your health.  Any medicines your care team has prescribed.  Preventive care, family planning, and ways to prevent chronic diseases.  Shots (vaccines)   HPV shots (up to age 26), if you've never had them before.  Hepatitis B shots (up to age 59), if you've never had them before.  COVID-19 shot: Get this shot when it's due.  Flu shot: Get a flu shot every year.  Tetanus shot: Get a tetanus shot every 10 years.  Pneumococcal, hepatitis A, and RSV shots: Ask your care team if you need these based on your risk.  Shingles shot (for age 50 and up)  General health tests  Diabetes screening:  Starting at age 35, Get screened for diabetes at least every 3 years.  If you are younger than age 35, ask your care team if you should be screened for diabetes.  Cholesterol test: At age 39, start having a cholesterol test every 5 years, or more often if advised.  Bone density scan (DEXA): At age 50, ask your care team if you should have this scan for osteoporosis (brittle bones).  Hepatitis C:  Get tested at least once in your life.  STIs (sexually transmitted infections)  Before age 24: Ask your care team if you should be screened for STIs.  After age 24: Get screened for STIs if you're at risk. You are at risk for STIs (including HIV) if:  You are sexually active with more than one person.  You don't use condoms every time.  You or a partner was diagnosed with a sexually transmitted infection.  If you are at risk for HIV, ask about PrEP medicine to prevent HIV.  Get tested for HIV at least once in your life, whether you are at risk for HIV or not.  Cancer screening tests  Cervical cancer screening: If you have a cervix, begin getting regular cervical cancer screening tests starting at age 21.  Breast cancer scan (mammogram): If you've ever had breasts, begin having regular mammograms starting at age 40. This is a scan to check for breast cancer.  Colon cancer screening: It is important to start screening for colon cancer at age 45.  Have a colonoscopy test every 10 years (or more often if you're at risk) Or, ask your provider about stool tests like a FIT test every year or Cologuard test every 3 years.  To learn more about your testing options, visit:   .  For help making a decision, visit:   https://bit.ly/fk91553.  Prostate cancer screening test: If you have a prostate, ask your care team if a prostate cancer screening test (PSA) at age 55 is right for you.  Lung cancer screening: If you are a current or former smoker ages 50 to 80, ask your care team if ongoing lung cancer screenings are right for you.  For informational purposes only. Not to replace the advice of your health care provider. Copyright   2023 [employer]. All rights reserved. Clinically reviewed by the [facility] Transitions Program. [employer] [id number] - REV 01/24.

## 2025-01-09 NOTE — PROGRESS NOTES
"Preventive Care Visit  [facility]  Joel Daniel Wegener, MD, Family Medicine  Jan 9, 2025      Assessment & Plan     Routine general medical examination at a health care facility  Working as  for [employer] now and enjoying.  Overall well.  Had immunizations to abstract via [facility] (tdap and hep b).     H/O diverticulitis of colon  Discussed managing flairs with high fiber diet/avoiding constipation for prevention.  If has flair can treat early mild flair with liquid diet.  If not improving current guidelines suggest IV antibiotics.  Could also do e-=visit early to discuss possibility of starting oral antibiotics early or virtual visit or in-person. Appreciated education.     Need for hepatitis C screening test    - Hepatitis C Screen Reflex to HCV RNA Quant and Genotype; Future  - Hepatitis C Screen Reflex to HCV RNA Quant and Genotype    CARDIOVASCULAR SCREENING; LDL GOAL LESS THAN 160    - Lipid panel reflex to direct LDL Non-fasting; Future  - Lipid panel reflex to direct LDL Non-fasting    Screening for diabetes mellitus    - Hemoglobin A1c; Future  - Hemoglobin A1c    Patient has been advised of split billing requirements and indicates understanding: Yes        BMI  Estimated body mass index is 25.64 kg/m  as calculated from the following:    Height as of this encounter: 1.848 m (6' 0.75\").    Weight as of this encounter: 87.5 kg (193 lb).   Weight management plan: Discussed healthy diet and exercise guidelines    Counseling  Appropriate preventive services were addressed with this patient via screening, questionnaire, or discussion as appropriate for fall prevention, nutrition, physical activity, Tobacco-use cessation, social engagement, weight loss and cognition.  Checklist reviewing preventive services available has been given to the patient.  Reviewed patient's diet, addressing concerns and/or questions.   He is at risk for lack of exercise and has been provided with " information to increase physical activity for the benefit of his well-being.           Subjective   Osmany is a 49 year old, presenting for the following:  Physical        1/9/2025     2:41 PM   Additional Questions   Roomed by ,zacarias PRUETT          Health Care Directive  Patient does not have a Health Care Directive: Discussed advance care planning with patient; information given to patient to review.      1/9/2025   General Health   How would you rate your overall physical health? Good   Feel stress (tense, anxious, or unable to sleep) Only a little   (!) STRESS CONCERN      1/9/2025   Nutrition   Three or more servings of calcium each day? Yes   Diet: Regular (no restrictions)   How many servings of fruit and vegetables per day? (!) 2-3   How many sweetened beverages each day? (!) 2         1/9/2025   Exercise   Days per week of moderate/strenous exercise 3 days         1/9/2025   Social Factors   Frequency of gathering with friends or relatives Once a week   Worry food won't last until get money to buy more No   Food not last or not have enough money for food? No   Do you have housing? (Housing is defined as stable permanent housing and does not include staying ouside in a car, in a tent, in an abandoned building, in an overnight shelter, or couch-surfing.) Yes   Are you worried about losing your housing? No   Lack of transportation? No   Unable to get utilities (heat,electricity)? No         1/9/2025   Dental   Dentist two times every year? (!) DECLINE         1/9/2025   TB Screening   Were you born outside of the [place]? No         Today's PHQ-2 Score:       1/9/2025     2:37 PM   PHQ-2 ( 1999 [employer])   Q1: Little interest or pleasure in doing things 0   Q2: Feeling down, depressed or hopeless 0   PHQ-2 Score 0    Q1: Little interest or pleasure in doing things Not at all   Q2: Feeling down, depressed or hopeless Not at all   PHQ-2 Score 0       Patient-reported           1/9/2025   Substance Use    Alcohol more than 3/day or more than 7/wk No   Do you use any other substances recreationally? No     Social History     Tobacco Use    Smoking status: Former    Smokeless tobacco: Never   Substance Use Topics    Alcohol use: Yes     Comment: 1-2 beers/month -occ    Drug use: No           1/9/2025   STI Screening   New sexual partner(s) since last STI/HIV test? No   ASCVD Risk   The ASCVD Risk score (Abelardo DK, et al., 2019) failed to calculate for the following reasons:    Cannot find a previous HDL lab    Cannot find a previous total cholesterol lab       Reviewed and updated as needed this visit by Provider                    Past Medical History:   Diagnosis Date    CARDIOVASCULAR SCREENING; LDL GOAL LESS THAN 160 5/9/2010    GERD (gastroesophageal reflux disease) 2005    EGD-2005    Perineal abscess 1/30/2015    Tobacco use disorder 3/21/2013    Using Electronic Cigarettes. Has not smoked in 2 weeks    Tubular adenoma 7/18/2017     Past Surgical History:   Procedure Laterality Date    COLONOSCOPY N/A 9/16/2024    Procedure: COLONOSCOPY, WITH POLYPECTOMY;  Surgeon: Alonzo Latif MD;  Location: [facility] OR    EXAM UNDER ANESTHESIA ANUS N/A 7/8/2015    Procedure: EXAM UNDER ANESTHESIA ANUS;  Surgeon: James Krause MD;  [facility]    EXAM UNDER ANESTHESIA RECTUM N/A 1/30/2015    Procedure: EXAM UNDER ANESTHESIA RECTUM;  Surgeon: James Krause MD;  [facility]    EXAM UNDER ANESTHESIA, FISTULOTOMY RECTUM, COMBINED N/A 4/6/2016    Procedure: COMBINED EXAM UNDER ANESTHESIA, FISTULOTOMY RECTUM;  Surgeon: James Krause MD;  Location: [facility] OR    FISTULOTOMY RECTUM N/A 7/8/2016    Procedure: FISTULOTOMY RECTUM;  Surgeon: James Krause MD;  [facility]    HC ESOPHAGOSCOPY, DIAGNOSTIC  2005    Normal    INCISION AND DRAINAGE RECTUM, COMBINED N/A 1/28/2015    Procedure: COMBINED INCISION AND DRAINAGE RECTUM;  Surgeon: Natasha Roldan MD;  Location: [facility] OR    IRRIGATION AND DEBRIDEMENT  "RECTUM, COMBINED N/A 1/30/2015    Procedure: COMBINED IRRIGATION AND DEBRIDEMENT RECTUM;  Surgeon: James Krause MD;  [facility]: [facility]    PLACEMENT OF SETON RECTUM N/A 1/30/2015    Procedure: PLACEMENT OF SETON RECTUM;  Surgeon: James Krause MD;  Location: [facility] OR    VASECTOMY  12/2016    [facility] APPENDECTOMY  2005         Review of Systems  Constitutional, neuro, ENT, endocrine, pulmonary, cardiac, gastrointestinal, genitourinary, musculoskeletal, integument and psychiatric systems are negative, except as otherwise noted.     Objective    Exam  Ht 1.848 m (6' 0.75\")   Wt 87.5 kg (193 lb)   BMI 25.64 kg/m     Estimated body mass index is 25.64 kg/m  as calculated from the following:    Height as of this encounter: 1.848 m (6' 0.75\").    Weight as of this encounter: 87.5 kg (193 lb).    Physical Exam  GENERAL: alert and no distress  EYES: Eyes grossly normal to inspection, PERRL and conjunctivae and sclerae normal  HENT: ear canals and TM's normal, nose and mouth without ulcers or lesions  NECK: no adenopathy, no asymmetry, masses, or scars  RESP: lungs clear to auscultation - no rales, rhonchi or wheezes  CV: regular rate and rhythm, normal S1 S2, no S3 or S4, no murmur, click or rub, no peripheral edema  ABDOMEN: soft, nontender, no hepatosplenomegaly, no masses and bowel sounds normal  MS: no gross musculoskeletal defects noted, no edema  SKIN: no suspicious lesions or rashes  NEURO: Normal strength and tone, mentation intact and speech normal  PSYCH: mentation appears normal, affect normal/bright        Signed Electronically by: Joel Daniel Wegener, MD    "

## 2025-02-16 ENCOUNTER — OFFICE VISIT (OUTPATIENT)
Dept: URGENT CARE | Facility: URGENT CARE | Age: 50
End: 2025-02-16
Payer: COMMERCIAL

## 2025-02-16 VITALS
DIASTOLIC BLOOD PRESSURE: 82 MMHG | SYSTOLIC BLOOD PRESSURE: 120 MMHG | WEIGHT: 195 LBS | OXYGEN SATURATION: 98 % | TEMPERATURE: 97.2 F | BODY MASS INDEX: 25.84 KG/M2 | RESPIRATION RATE: 18 BRPM | HEART RATE: 75 BPM | HEIGHT: 73 IN

## 2025-02-16 DIAGNOSIS — R05.9 COUGH, UNSPECIFIED TYPE: Primary | ICD-10-CM

## 2025-02-16 LAB
FLUAV AG SPEC QL IA: NEGATIVE
FLUBV AG SPEC QL IA: NEGATIVE

## 2025-02-16 PROCEDURE — 87804 INFLUENZA ASSAY W/OPTIC: CPT | Performed by: FAMILY MEDICINE

## 2025-02-16 PROCEDURE — 87635 SARS-COV-2 COVID-19 AMP PRB: CPT | Performed by: FAMILY MEDICINE

## 2025-02-16 PROCEDURE — 99213 OFFICE O/P EST LOW 20 MIN: CPT | Performed by: FAMILY MEDICINE

## 2025-02-16 ASSESSMENT — ENCOUNTER SYMPTOMS
FATIGUE: 1
COUGH: 1
HEADACHES: 1
FEVER: 1

## 2025-02-16 NOTE — PROGRESS NOTES
"Patient presents with:  Urgent Care: Sick since 2/9. Coughing, lung congestion, runny nose, headache and fatigue.       Assessment/MDM:    Jerry Hernandez is a 49 year old male is here today for cough . the following systemic symptoms are present : productive at times . Flu is negative        HPI:  URI  This is a new problem. The current episode started in the past 7 days. The problem occurs constantly. Associated symptoms include congestion, coughing, fatigue, a fever and headaches.        Review of Systems   Constitutional:  Positive for fatigue and fever.   HENT:  Positive for congestion.    Respiratory:  Positive for cough.    Neurological:  Positive for headaches.   All other systems reviewed and are negative.      Vitals:    02/16/25 1206   BP: 120/82   Pulse: 75   Resp: 18   Temp: 97.2  F (36.2  C)   TempSrc: Temporal   SpO2: 98%   Weight: 88.5 kg (195 lb)   Height: 1.854 m (6' 1\")       Physical Exam  Vitals and nursing note reviewed.   HENT:      Right Ear: Tympanic membrane normal.      Left Ear: Tympanic membrane normal.      Nose: Congestion present.   Cardiovascular:      Rate and Rhythm: Normal rate and regular rhythm.   Pulmonary:      Effort: Pulmonary effort is normal.      Breath sounds: Normal breath sounds.   Neurological:      Mental Status: He is alert.         Results:  No results found for any visits on 02/16/25.        Past Medical History: has been reviewed by me. I have also reviewed past visits, lab results and studies  Adverse Drug Reactions: Shellfish allergy    Medications: reviewed by me today    Family History: Reviewed by me today  Social History:   Social History     Tobacco Use    Smoking status: Former    Smokeless tobacco: Never   Substance Use Topics    Alcohol use: Yes     Comment: 1-2 beers/month -occ       Tobacco:   History   Smoking Status    Former   Smokeless Tobacco    Never         I have reviewed and recommended any over-the-counter medications that will aid in " the symptomatic relief of this illness.    The risk of complications, morbidity, and/or mortality of patient management decisions were made during the visit with the patient. These may be associated with the patient s problems, the diagnostic procedures, or the treatment. This includes possible management options selected, as well options considered but ultimately not selected, after shared medical decision making with the patient and/or family.        ICD-10-CM    1. Cough  R05.9 Influenza A/B antigen           Cherry Treadwell MD  2/16/2025, 12:25 PM.      There are no Patient Instructions on file for this visit.

## 2025-02-18 LAB — SARS-COV-2 RNA RESP QL NAA+PROBE: NEGATIVE

## (undated) DEVICE — KIT ENDO TURNOVER/PROCEDURE CARRY-ON 101822

## (undated) DEVICE — SPECIMEN CONTAINER 3OZ W/FORMALIN 59901

## (undated) DEVICE — ENDO FORCEP BX CAPTURA PRO SPIKE G50696

## (undated) DEVICE — SOL WATER IRRIG 500ML BOTTLE 2F7113

## (undated) DEVICE — TUBING SUCTION MEDI-VAC 1/4"X20' N620A

## (undated) DEVICE — GOWN IMPERVIOUS 2XL BLUE

## (undated) DEVICE — SUCTION MANIFOLD NEPTUNE 2 SYS 1 PORT 702-025-000

## (undated) RX ORDER — FENTANYL CITRATE 50 UG/ML
INJECTION, SOLUTION INTRAMUSCULAR; INTRAVENOUS
Status: DISPENSED
Start: 2024-09-16